# Patient Record
Sex: FEMALE | Race: WHITE | Employment: UNEMPLOYED | URBAN - METROPOLITAN AREA
[De-identification: names, ages, dates, MRNs, and addresses within clinical notes are randomized per-mention and may not be internally consistent; named-entity substitution may affect disease eponyms.]

---

## 2017-02-28 ENCOUNTER — HOSPITAL ENCOUNTER (EMERGENCY)
Age: 27
Discharge: HOME | End: 2017-02-28
Attending: FAMILY MEDICINE

## 2017-02-28 VITALS
TEMPERATURE: 97.7 F | SYSTOLIC BLOOD PRESSURE: 142 MMHG | BODY MASS INDEX: 26.9 KG/M2 | RESPIRATION RATE: 20 BRPM | OXYGEN SATURATION: 98 % | DIASTOLIC BLOOD PRESSURE: 83 MMHG | HEART RATE: 100 BPM | HEIGHT: 60 IN | WEIGHT: 137 LBS

## 2017-02-28 DIAGNOSIS — H66.002 ACUTE SUPPURATIVE OTITIS MEDIA OF LEFT EAR WITHOUT SPONTANEOUS RUPTURE OF TYMPANIC MEMBRANE, RECURRENCE NOT SPECIFIED: Primary | ICD-10-CM

## 2017-02-28 RX ORDER — LORAZEPAM 1 MG/1
1 TABLET ORAL
COMMUNITY
End: 2018-07-19

## 2017-02-28 RX ORDER — DEXTROAMPHETAMINE SACCHARATE, AMPHETAMINE ASPARTATE, DEXTROAMPHETAMINE SULFATE AND AMPHETAMINE SULFATE 5; 5; 5; 5 MG/1; MG/1; MG/1; MG/1
20 TABLET ORAL 3 TIMES DAILY
COMMUNITY

## 2017-02-28 RX ORDER — FLUTICASONE PROPIONATE 50 MCG
2 SPRAY, SUSPENSION (ML) NASAL DAILY
COMMUNITY

## 2017-02-28 RX ORDER — AMOXICILLIN 875 MG/1
875 TABLET, FILM COATED ORAL EVERY 12 HOURS
Qty: 20 TAB | Refills: 0 | Status: SHIPPED | OUTPATIENT
Start: 2017-02-28 | End: 2017-03-10

## 2017-03-01 NOTE — DISCHARGE INSTRUCTIONS
Ear Infection (Otitis Media): Care Instructions  Your Care Instructions    An ear infection may start with a cold and affect the middle ear (otitis media). It can hurt a lot. Most ear infections clear up on their own in a couple of days. Most often you will not need antibiotics. This is because many ear infections are caused by a virus. Antibiotics don't work against a virus. Regular doses of pain medicines are the best way to reduce your fever and help you feel better. Follow-up care is a key part of your treatment and safety. Be sure to make and go to all appointments, and call your doctor if you are having problems. It's also a good idea to know your test results and keep a list of the medicines you take. How can you care for yourself at home? · Take pain medicines exactly as directed. ¨ If the doctor gave you a prescription medicine for pain, take it as prescribed. ¨ If you are not taking a prescription pain medicine, take an over-the-counter medicine, such as acetaminophen (Tylenol), ibuprofen (Advil, Motrin), or naproxen (Aleve). Read and follow all instructions on the label. ¨ Do not take two or more pain medicines at the same time unless the doctor told you to. Many pain medicines have acetaminophen, which is Tylenol. Too much acetaminophen (Tylenol) can be harmful. · Plan to take a full dose of pain reliever before bedtime. Getting enough sleep will help you get better. · Try a warm, moist washcloth on the ear. It may help relieve pain. · If your doctor prescribed antibiotics, take them as directed. Do not stop taking them just because you feel better. You need to take the full course of antibiotics. When should you call for help? Call your doctor now or seek immediate medical care if:  · You have new or increasing ear pain. · You have new or increasing pus or blood draining from your ear. · You have a fever with a stiff neck or a severe headache.   Watch closely for changes in your health, and be sure to contact your doctor if:  · You have new or worse symptoms. · You are not getting better after taking an antibiotic for 2 days. Where can you learn more? Go to http://piotr-syed.info/. Enter Z342 in the search box to learn more about \"Ear Infection (Otitis Media): Care Instructions. \"  Current as of: July 29, 2016  Content Version: 11.1  © 1074-2922 [employer]. Care instructions adapted under license by [employer] (which disclaims liability or warranty for this information). If you have questions about a medical condition or this instruction, always ask your healthcare professional. [address] any warranty or liability for your use of this information.

## 2017-03-01 NOTE — UC PROVIDER NOTE
Patient is a 31 y.o. female presenting with ear pain. The history is provided by the patient. Ear Pain    This is a new problem. Episode onset: today. The problem occurs constantly. The problem has been gradually worsening. Patient complains that the left ear is affected. There has been no fever. The pain is moderate. Associated symptoms include rhinorrhea, sore throat and cough. Pertinent negatives include no ear discharge, no headaches, no hearing loss, no vomiting and no rash. The risk factors include recent URI. History reviewed. No pertinent past medical history. History reviewed. No pertinent surgical history. History reviewed. No pertinent family history. Social History     Social History    Marital status: SINGLE     Spouse name: N/A    Number of children: N/A    Years of education: N/A     Occupational History    Not on file. Social History Main Topics    Smoking status: Never Smoker    Smokeless tobacco: Not on file    Alcohol use Not on file    Drug use: Not on file    Sexual activity: Not on file     Other Topics Concern    Not on file     Social History Narrative    No narrative on file                ALLERGIES: Sulfa (sulfonamide antibiotics)    Review of Systems   Constitutional: Negative for chills and fever. HENT: Positive for congestion, ear pain, rhinorrhea and sore throat. Negative for ear discharge and hearing loss. Respiratory: Positive for cough. Cardiovascular: Negative for chest pain and palpitations. Gastrointestinal: Negative for nausea and vomiting. Musculoskeletal: Negative for arthralgias. Skin: Negative for rash. Neurological: Negative for headaches. Vitals:    02/28/17 1905   BP: 142/83   Pulse: 100   Resp: 20   Temp: 97.7 °F (36.5 °C)   SpO2: 98%   Weight: 62.1 kg (137 lb)   Height: 5' (1.524 m)       Physical Exam   Constitutional: She appears well-developed and well-nourished. No distress.    HENT:   Right Ear: External ear and ear canal normal. A middle ear effusion is present. Left Ear: External ear and ear canal normal. Tympanic membrane is erythematous and bulging. A middle ear effusion is present. Nose: Rhinorrhea present. Right sinus exhibits no maxillary sinus tenderness and no frontal sinus tenderness. Left sinus exhibits no maxillary sinus tenderness and no frontal sinus tenderness. Mouth/Throat: Oropharynx is clear and moist and mucous membranes are normal. No oropharyngeal exudate, posterior oropharyngeal edema, posterior oropharyngeal erythema or tonsillar abscesses. Cardiovascular: Normal rate, regular rhythm and normal heart sounds. Pulmonary/Chest: Effort normal and breath sounds normal. No respiratory distress. She has no wheezes. She has no rales. Lymphadenopathy:     She has cervical adenopathy. Neurological: She is alert. Skin: She is not diaphoretic. Psychiatric: She has a normal mood and affect. Her behavior is normal. Judgment and thought content normal.   Nursing note and vitals reviewed. MDM     Differential Diagnosis; Clinical Impression; Plan:     CLINICAL IMPRESSION:  Acute suppurative otitis media of left ear without spontaneous rupture of tympanic membrane, recurrence not specified  (primary encounter diagnosis)    Plan:  1. Amoxicillin  2. Ibuprofen prn  3. PCP if no improvement  Risk of Significant Complications, Morbidity, and/or Mortality:   Presenting problems: Moderate  Management options:   Moderate  Progress:   Patient progress:  Stable      Procedures

## 2017-03-10 ENCOUNTER — HOSPITAL ENCOUNTER (OUTPATIENT)
Dept: CT IMAGING | Age: 27
Discharge: HOME | End: 2017-03-10
Payer: COMMERCIAL

## 2017-03-10 DIAGNOSIS — N20.0 KIDNEY STONE: ICD-10-CM

## 2017-03-10 PROCEDURE — 74176 CT ABD & PELVIS W/O CONTRAST: CPT

## 2017-06-16 ENCOUNTER — HOSPITAL ENCOUNTER (OUTPATIENT)
Dept: LAB | Age: 27
Discharge: HOME | End: 2017-06-16

## 2017-06-16 ENCOUNTER — HOSPITAL ENCOUNTER (EMERGENCY)
Age: 27
Discharge: HOME | End: 2017-06-16
Attending: FAMILY MEDICINE

## 2017-06-16 VITALS
RESPIRATION RATE: 14 BRPM | HEART RATE: 104 BPM | HEIGHT: 60 IN | SYSTOLIC BLOOD PRESSURE: 109 MMHG | OXYGEN SATURATION: 95 % | BODY MASS INDEX: 25.72 KG/M2 | TEMPERATURE: 98 F | WEIGHT: 131 LBS | DIASTOLIC BLOOD PRESSURE: 63 MMHG

## 2017-06-16 DIAGNOSIS — R50.9 FEVER, UNSPECIFIED FEVER CAUSE: Primary | ICD-10-CM

## 2017-06-16 DIAGNOSIS — J02.9 VIRAL PHARYNGITIS: ICD-10-CM

## 2017-06-16 LAB
BILIRUB UR QL: ABNORMAL
GLUCOSE UR QL STRIP.AUTO: NEGATIVE MG/DL
HCG UR QL: NEGATIVE
KETONES UR-MCNC: NEGATIVE MG/DL
LEUKOCYTE ESTERASE UR QL STRIP: NEGATIVE
NITRITE UR QL: NEGATIVE
PH UR: 6 [PH] (ref 5–8)
PROT UR QL: 30 MG/DL
RBC # UR STRIP: NEGATIVE /UL
S PYO AG THROAT QL: NEGATIVE
SP GR UR: 1.03 (ref 1–1.03)
UROBILINOGEN UR QL: 0.2 EU/DL (ref 0.2–1)

## 2017-06-16 PROCEDURE — 87147 CULTURE TYPE IMMUNOLOGIC: CPT | Performed by: FAMILY MEDICINE

## 2017-06-16 PROCEDURE — 87070 CULTURE OTHR SPECIMN AEROBIC: CPT | Performed by: FAMILY MEDICINE

## 2017-06-16 RX ORDER — LIDOCAINE HYDROCHLORIDE 20 MG/ML
5 SOLUTION OROPHARYNGEAL AS NEEDED
Qty: 1 BOTTLE | Refills: 0 | Status: SHIPPED | OUTPATIENT
Start: 2017-06-16 | End: 2018-07-16

## 2017-06-16 NOTE — UC PROVIDER NOTE
Patient is a 31 y.o. female presenting with sore throat. The history is provided by the patient. No  was used. Sore Throat    This is a new problem. The current episode started yesterday. The problem has been gradually worsening. There has been a fever of 102 - 102.9 F. The fever has been present for 1 - 2 days. Associated symptoms include diarrhea. Pertinent negatives include no vomiting, no congestion, no swollen glands, no trouble swallowing and no cough. She has had no exposure to strep or mono. She has tried NSAIDs for the symptoms. The treatment provided moderate relief. No past medical history on file. No past surgical history on file. No family history on file. Social History     Social History    Marital status: SINGLE     Spouse name: N/A    Number of children: N/A    Years of education: N/A     Occupational History    Not on file. Social History Main Topics    Smoking status: Never Smoker    Smokeless tobacco: Not on file    Alcohol use Not on file    Drug use: Not on file    Sexual activity: Not on file     Other Topics Concern    Not on file     Social History Narrative                ALLERGIES: Sulfa (sulfonamide antibiotics)    Review of Systems   Constitutional: Positive for fatigue and fever. HENT: Positive for sore throat. Negative for congestion and trouble swallowing. Eyes: Negative. Respiratory: Negative for cough. Cardiovascular: Negative. Gastrointestinal: Positive for diarrhea. Negative for vomiting. Endocrine: Negative. Genitourinary: Negative. Musculoskeletal: Negative. Skin: Negative. Allergic/Immunologic: Negative. Neurological: Negative. Hematological: Negative. Psychiatric/Behavioral: Negative.         Vitals:    06/16/17 1351   BP: 109/63   Pulse: (!) 104   Resp: 14   Temp: 98 °F (36.7 °C)   SpO2: 95%   Weight: 59.4 kg (131 lb)   Height: 5' (1.524 m)       Physical Exam   Constitutional: She is oriented to person, place, and time. She appears well-developed and well-nourished. HENT:   Head: Normocephalic and atraumatic. Right Ear: External ear normal.   Left Ear: External ear normal.   Nose: Nose normal.   Mouth/Throat: No oropharyngeal exudate. + posterior pharyngeal erythema   No tonsillar enlargement  No cervical adenopthy   Eyes: Conjunctivae and EOM are normal. Pupils are equal, round, and reactive to light. Neck: Normal range of motion. Neck supple. Cardiovascular: Normal rate, regular rhythm and normal heart sounds.      Pulmonary/Chest: Effort normal and breath sounds normal. She has no wheezes. Abdominal: Soft. There is no tenderness. No CVAT   Musculoskeletal: Normal range of motion. Lymphadenopathy:     She has no cervical adenopathy. Neurological: She is alert and oriented to person, place, and time. Skin: Skin is warm and dry. Psychiatric: She has a normal mood and affect. Her behavior is normal. Judgment and thought content normal.   Nursing note and vitals reviewed. MDM     Differential Diagnosis; Clinical Impression; Plan:     CLINICAL IMPRESSION:  Fever, unspecified fever cause  (primary encounter diagnosis)  Viral pharyngitis    Plan:  1. Fever/ Viral Pharyngitis  2. Rest and drink plenty of fluids  3. Follow up with PCP as needed  Amount and/or Complexity of Data Reviewed:   Clinical lab tests:  Ordered and reviewed  Risk of Significant Complications, Morbidity, and/or Mortality:   Presenting problems: Moderate  Diagnostic procedures:   Moderate  Progress:   Patient progress:  Stable      Procedures

## 2017-06-16 NOTE — DISCHARGE INSTRUCTIONS
Sore Throat: Care Instructions  Your Care Instructions    Infection by bacteria or a virus causes most sore throats. Cigarette smoke, dry air, air pollution, allergies, and yelling can also cause a sore throat. Sore throats can be painful and annoying. Fortunately, most sore throats go away on their own. If you have a bacterial infection, your doctor may prescribe antibiotics. Follow-up care is a key part of your treatment and safety. Be sure to make and go to all appointments, and call your doctor if you are having problems. It's also a good idea to know your test results and keep a list of the medicines you take. How can you care for yourself at home? · If your doctor prescribed antibiotics, take them as directed. Do not stop taking them just because you feel better. You need to take the full course of antibiotics. · Gargle with warm salt water once an hour to help reduce swelling and relieve discomfort. Use 1 teaspoon of salt mixed in 1 cup of warm water. · Take an over-the-counter pain medicine, such as acetaminophen (Tylenol), ibuprofen (Advil, Motrin), or naproxen (Aleve). Read and follow all instructions on the label. · Be careful when taking over-the-counter cold or flu medicines and Tylenol at the same time. Many of these medicines have acetaminophen, which is Tylenol. Read the labels to make sure that you are not taking more than the recommended dose. Too much acetaminophen (Tylenol) can be harmful. · Drink plenty of fluids. Fluids may help soothe an irritated throat. Hot fluids, such as tea or soup, may help decrease throat pain. · Use over-the-counter throat lozenges to soothe pain. Regular cough drops or hard candy may also help. These should not be given to young children because of the risk of choking. · Do not smoke or allow others to smoke around you. If you need help quitting, talk to your doctor about stop-smoking programs and medicines.  These can increase your chances of quitting for good. · Use a vaporizer or humidifier to add moisture to your bedroom. Follow the directions for cleaning the machine. When should you call for help? Call your doctor now or seek immediate medical care if:  · You have new or worse trouble swallowing. · Your sore throat gets much worse on one side. Watch closely for changes in your health, and be sure to contact your doctor if you do not get better as expected. Where can you learn more? Go to http://piotr-syed.info/. Enter [id number] [id number] in the search box to learn more about \"Sore Throat: Care Instructions. \"  Current as of: July 29, 2016  Content Version: 11.2  © 1603-8068 [employer] Care instructions adapted under license by [employer] (which disclaims liability or warranty for this information). If you have questions about a medical condition or this instruction, always ask your healthcare professional. [place] [address] any warranty or liability for your use of this information. Learning About Fever  What is a fever? A fever is a high body temperature. It's one way your body fights being sick. A fever shows that the body is responding to infection or other illnesses, both minor and severe. A fever is a symptom, not an illness by itself. A fever can be a sign that you are ill, but most fevers are not caused by a serious problem. You may have a fever with a minor illness, such as a cold. But sometimes a very serious infection may cause little or no fever. It is important to look at other symptoms, other conditions you have, and how you feel in general. In children, notice how they act and see what symptoms they complain of. What is a normal body temperature? A normal body temperature is about 98. 6ºF. Some people have a normal temperature that is a little higher or a little lower than this. Your temperature may be a little lower in the morning than it is later in the day.  It may go up during hot weather or when you exercise, wear heavy clothes, or take a hot bath. Your temperature may also be different depending on how you take it. A temperature taken in the mouth (oral) or under the arm may be a little lower than your core temperature (rectal). What is a fever temperature? A core temperature of 100.4°F or above is considered a fever. What can cause a fever? A fever may be caused by:  · Infections. This is the most common cause of a fever. Examples of infections that can cause a fever include the flu, a kidney infection, or pneumonia. · Some medicines. · Severe trauma or injury, such as a heart attack, stroke, heatstroke, or burns. · Other medical conditions, such as arthritis and some cancers. How can you treat a fever at home? · Ask your doctor if you can take an over-the-counter pain medicine, such as acetaminophen (Tylenol), ibuprofen (Advil, Motrin), or naproxen (Aleve). Be safe with medicines. Read and follow all instructions on the label. · To prevent dehydration, drink plenty of fluids. Choose water and other caffeine-free clear liquids until you feel better. If you have kidney, heart, or liver disease and have to limit fluids, talk with your doctor before you increase the amount of fluids you drink. Follow-up care is a key part of your treatment and safety. Be sure to make and go to all appointments, and call your doctor if you are having problems. It's also a good idea to know your test results and keep a list of the medicines you take. Where can you learn more? Go to http://piotr-syed.info/. Enter H869 in the search box to learn more about \"Learning About Fever. \"  Current as of: May 27, 2016  Content Version: 11.2  © 8579-7650 [employer]. Care instructions adapted under license by [employer] (which disclaims liability or warranty for this information).  If you have questions about a medical condition or this instruction, always ask your healthcare professional. [place] [address] any warranty or liability for your use of this information.

## 2017-06-18 LAB
BACTERIA SPEC CULT: ABNORMAL
BACTERIA SPEC CULT: ABNORMAL
SERVICE CMNT-IMP: ABNORMAL

## 2017-06-19 RX ORDER — AMOXICILLIN AND CLAVULANATE POTASSIUM 875; 125 MG/1; MG/1
1 TABLET, FILM COATED ORAL 2 TIMES DAILY
Qty: 20 TAB | Refills: 0 | Status: SHIPPED | OUTPATIENT
Start: 2017-06-19 | End: 2017-06-29

## 2018-07-16 ENCOUNTER — HOSPITAL ENCOUNTER (INPATIENT)
Age: 28
LOS: 2 days | Discharge: HOME | DRG: 881 | End: 2018-07-19
Attending: EMERGENCY MEDICINE | Admitting: PSYCHIATRY & NEUROLOGY
Payer: COMMERCIAL

## 2018-07-16 DIAGNOSIS — F32.A DEPRESSION, UNSPECIFIED DEPRESSION TYPE: Primary | ICD-10-CM

## 2018-07-16 PROBLEM — F41.9 ANXIETY DISORDER: Status: ACTIVE | Noted: 2018-07-16

## 2018-07-16 LAB
ALBUMIN SERPL-MCNC: 3.9 G/DL (ref 3.5–5)
ALBUMIN/GLOB SERPL: 1 {RATIO} (ref 1.1–2.2)
ALP SERPL-CCNC: 108 U/L (ref 45–117)
ALT SERPL-CCNC: 38 U/L (ref 12–78)
AMPHET UR QL SCN: POSITIVE
ANION GAP SERPL CALC-SCNC: 7 MMOL/L (ref 5–15)
APAP SERPL-MCNC: <2 UG/ML (ref 10–30)
APPEARANCE UR: CLEAR
AST SERPL-CCNC: 21 U/L (ref 15–37)
BACTERIA URNS QL MICRO: NEGATIVE /HPF
BARBITURATES UR QL SCN: NEGATIVE
BASOPHILS # BLD: 0 K/UL (ref 0–0.1)
BASOPHILS NFR BLD: 0 % (ref 0–1)
BENZODIAZ UR QL: NEGATIVE
BILIRUB SERPL-MCNC: 0.4 MG/DL (ref 0.2–1)
BILIRUB UR QL: NEGATIVE
BUN SERPL-MCNC: 5 MG/DL (ref 6–20)
BUN/CREAT SERPL: 6 (ref 12–20)
CALCIUM SERPL-MCNC: 9.3 MG/DL (ref 8.5–10.1)
CANNABINOIDS UR QL SCN: NEGATIVE
CHLORIDE SERPL-SCNC: 107 MMOL/L (ref 97–108)
CO2 SERPL-SCNC: 27 MMOL/L (ref 21–32)
COCAINE UR QL SCN: NEGATIVE
COLOR UR: NORMAL
CREAT SERPL-MCNC: 0.79 MG/DL (ref 0.55–1.02)
DIFFERENTIAL METHOD BLD: ABNORMAL
DRUG SCRN COMMENT,DRGCM: ABNORMAL
EOSINOPHIL # BLD: 0.1 K/UL (ref 0–0.4)
EOSINOPHIL NFR BLD: 1 % (ref 0–7)
EPITH CASTS URNS QL MICRO: NORMAL /LPF
ERYTHROCYTE [DISTWIDTH] IN BLOOD BY AUTOMATED COUNT: 11.5 % (ref 11.5–14.5)
ETHANOL SERPL-MCNC: <10 MG/DL
GLOBULIN SER CALC-MCNC: 4 G/DL (ref 2–4)
GLUCOSE SERPL-MCNC: 97 MG/DL (ref 65–100)
GLUCOSE UR STRIP.AUTO-MCNC: NEGATIVE MG/DL
HCG UR QL: NEGATIVE
HCT VFR BLD AUTO: 47 % (ref 35–47)
HGB BLD-MCNC: 16 G/DL (ref 11.5–16)
HGB UR QL STRIP: NEGATIVE
IMM GRANULOCYTES # BLD: 0 K/UL (ref 0–0.04)
IMM GRANULOCYTES NFR BLD AUTO: 0 % (ref 0–0.5)
KETONES UR QL STRIP.AUTO: NEGATIVE MG/DL
LEUKOCYTE ESTERASE UR QL STRIP.AUTO: NEGATIVE
LYMPHOCYTES # BLD: 3 K/UL (ref 0.8–3.5)
LYMPHOCYTES NFR BLD: 26 % (ref 12–49)
MCH RBC QN AUTO: 30.2 PG (ref 26–34)
MCHC RBC AUTO-ENTMCNC: 34 G/DL (ref 30–36.5)
MCV RBC AUTO: 88.7 FL (ref 80–99)
METHADONE UR QL: NEGATIVE
MONOCYTES # BLD: 0.7 K/UL (ref 0–1)
MONOCYTES NFR BLD: 6 % (ref 5–13)
NEUTS SEG # BLD: 7.6 K/UL (ref 1.8–8)
NEUTS SEG NFR BLD: 66 % (ref 32–75)
NITRITE UR QL STRIP.AUTO: NEGATIVE
NRBC # BLD: 0 K/UL (ref 0–0.01)
NRBC BLD-RTO: 0 PER 100 WBC
OPIATES UR QL: NEGATIVE
PCP UR QL: NEGATIVE
PH UR STRIP: 7 [PH] (ref 5–8)
PLATELET # BLD AUTO: 264 K/UL (ref 150–400)
PMV BLD AUTO: 10.1 FL (ref 8.9–12.9)
POTASSIUM SERPL-SCNC: 4.1 MMOL/L (ref 3.5–5.1)
PROT SERPL-MCNC: 7.9 G/DL (ref 6.4–8.2)
PROT UR STRIP-MCNC: NEGATIVE MG/DL
RBC # BLD AUTO: 5.3 M/UL (ref 3.8–5.2)
RBC #/AREA URNS HPF: NORMAL /HPF (ref 0–5)
SALICYLATES SERPL-MCNC: <1.7 MG/DL (ref 2.8–20)
SODIUM SERPL-SCNC: 141 MMOL/L (ref 136–145)
SP GR UR REFRACTOMETRY: <1.005 (ref 1–1.03)
UR CULT HOLD, URHOLD: NORMAL
UROBILINOGEN UR QL STRIP.AUTO: 0.2 EU/DL (ref 0.2–1)
WBC # BLD AUTO: 11.5 K/UL (ref 3.6–11)
WBC URNS QL MICRO: NORMAL /HPF (ref 0–4)

## 2018-07-16 PROCEDURE — 80307 DRUG TEST PRSMV CHEM ANLYZR: CPT | Performed by: PHYSICIAN ASSISTANT

## 2018-07-16 PROCEDURE — 80053 COMPREHEN METABOLIC PANEL: CPT | Performed by: PHYSICIAN ASSISTANT

## 2018-07-16 PROCEDURE — 85025 COMPLETE CBC W/AUTO DIFF WBC: CPT | Performed by: PHYSICIAN ASSISTANT

## 2018-07-16 PROCEDURE — 84443 ASSAY THYROID STIM HORMONE: CPT | Performed by: PSYCHIATRY & NEUROLOGY

## 2018-07-16 PROCEDURE — 99284 EMERGENCY DEPT VISIT MOD MDM: CPT

## 2018-07-16 PROCEDURE — 90791 PSYCH DIAGNOSTIC EVALUATION: CPT

## 2018-07-16 PROCEDURE — 81001 URINALYSIS AUTO W/SCOPE: CPT | Performed by: PHYSICIAN ASSISTANT

## 2018-07-16 PROCEDURE — 81025 URINE PREGNANCY TEST: CPT

## 2018-07-16 PROCEDURE — 36415 COLL VENOUS BLD VENIPUNCTURE: CPT | Performed by: PHYSICIAN ASSISTANT

## 2018-07-16 RX ORDER — MELATONIN
1000 DAILY
Status: ON HOLD | COMMUNITY
End: 2018-07-17

## 2018-07-16 RX ORDER — LEVOTHYROXINE AND LIOTHYRONINE 9.5; 2.25 UG/1; UG/1
15 TABLET ORAL
COMMUNITY

## 2018-07-16 RX ORDER — LANOLIN ALCOHOL/MO/W.PET/CERES
1000 CREAM (GRAM) TOPICAL DAILY
COMMUNITY

## 2018-07-16 RX ORDER — GLUCOSAMINE/CHONDR SU A SOD 167-133 MG
500 CAPSULE ORAL
Status: ON HOLD | COMMUNITY
End: 2018-07-17

## 2018-07-16 RX ORDER — LEVOTHYROXINE AND LIOTHYRONINE 19; 4.5 UG/1; UG/1
30 TABLET ORAL
COMMUNITY

## 2018-07-16 RX ORDER — TOPIRAMATE 25 MG/1
25 TABLET ORAL
Status: ON HOLD | COMMUNITY
End: 2018-07-17

## 2018-07-16 NOTE — IP AVS SNAPSHOT
[address] 
[phone] Patient: Massiel Briscoe MRN: [id number] :1990 About your hospitalization You were admitted on:  2018 You last received care in the:  [address] You were discharged on:  2018 Why you were hospitalized Your primary diagnosis was: Anxiety Disorder Your diagnoses also included:  Depressive Disorder Follow-up Information Follow up With Details Comments Contact Info Cipriano Early On 2018 You have a 3:00pm appointment for individual therapy. [address] [facility] 700 1400 [facility], [address] [place] [address] 
[phone] Payam Aquino MD On 2018 You have a 1:30pm appointment with your primary care provider. 612 [facility] [address] [facility] 7 [address] 
[phone] 1305 [facility] Call Please call to schedule an appointment with a psychiatric nurse practitioner for medication management. 6800  39[facility], [facility] [place], 1201 [facility] 
[phone] Discharge Orders None A check candice indicates which time of day the medication should be taken. My Medications START taking these medications Instructions Each Dose to Equal  
 Morning Noon Evening Bedtime  
 mirtazapine 15 mg tablet Commonly known as:  Omaira Bailey Your next dose is:  bedtime Take 1 Tab by mouth nightly. Indications: major depressive disorder 15 mg CONTINUE taking these medications Instructions Each Dose to Equal  
 Morning Noon Evening Bedtime ADDERALL 20 mg tablet Generic drug:  dextroamphetamine-amphetamine Your next dose is: Today at noon and 4pm   
   
 Take 20 mg by mouth three (3) times daily. 20 mg  
    
  
   
  
   
  
   
  
 * ARMOUR THYROID 15 mg tablet Generic drug:  thyroid (Pork) Your next dose is:  Tomorrow before breakfast  
   
 Take 15 mg by mouth Daily (before breakfast). 15 mg  
    
  
   
   
   
  
 * SUSANNE THYROID 30 mg tablet Generic drug:  thyroid (Pork) Your next dose is:  Before breakfast  
   
 Take 30 mg by mouth Daily (before breakfast). 30 mg  
    
  
   
   
   
  
 cholecalciferol (VITAMIN D3) 5,000 unit Tab tablet Commonly known as:  VITAMIN D3 Your next dose is:  9am  
   
 Take 5,000 Units by mouth daily. 5000 Units FLONASE 50 mcg/actuation nasal spray Generic drug:  fluticasone Your next dose is:  9am and 9pm  
   
 2 Sprays by Both Nostrils route daily. 2 [place] LORYNA (28) 3-0.02 mg Tab Generic drug:  drospirenone-ethinyl estradiol Your next dose is:  9am 
  
   
 Take 1 Tab by mouth daily. Indications: Pregnancy Contraception 1 Tab  
    
  
   
   
   
  
 niacin  mg tablet Commonly known as:  Patricia Lauryn Your next dose is:  Bedtime Take 500 mg by mouth nightly. 500 mg  
    
   
   
   
  
  
 TROKENDI XR 25 mg capsule Generic drug:  topiramate ER Your next dose is:  9am  
   
 Take 25 mg by mouth daily. Indications: MIGRAINE PREVENTION  
 25 mg  
    
  
   
   
   
  
 VITAMIN B-12 1,000 mcg tablet Generic drug:  cyanocobalamin Your next dose is:  9am  
   
 Take 1,000 mcg by mouth daily. 1000 mcg * Notice: This list has 2 medication(s) that are the same as other medications prescribed for you. Read the directions carefully, and ask your doctor or other care provider to review them with you. STOP taking these medications ATIVAN 1 mg tablet Generic drug:  LORazepam  
   
  
  
  
Where to Get Your Medications Information on where to get these meds will be given to you by the nurse or doctor. ! Ask your nurse or doctor about these medications  
  mirtazapine 15 mg tablet Discharge Instructions DISCHARGE SUMMARY:  Substance Abuse NAME:Martha Davidson : 1990 MRN: [id number] The patient Laverne Baer exhibits the ability to function in a less restrictive environment. There has been no evidence of withdrawal for the past 24 hours. No suicidal/homicidal threat or behavior has been observed for the past 24 hours. If weapons involved, how are they secured? No weapons involved. Is patient aware of and in agreement with discharge plan? Yes Arrangements for medication:  Prescriptions given to patient. Referral for substance treatment? Yes, Dr. Franklin Vasquez Referral for smoking cessation needed? Yes, refused. Copy of discharge instructions to provider?:  Dr. Michael Taylor Arrangements for transportation home:  Patient's personal vehicle is in the hospital parking deck. Mental health crisis number:  933 or your local mental health crisis line number at [phone]. DISCHARGE SUMMARY from Nurse PATIENT INSTRUCTIONS: 
 
What to do at Home: 
Recommended activity: Activity as tolerated, If you experience any of the following symptoms thoughts of harming self, feeling overwhelmed with anxiety, hopelessness or sadness, please follow up with your assigned providers and local crisis number. *  Please give a list of your current medications to your Primary Care Provider. *  Please update this list whenever your medications are discontinued, doses are 
    changed, or new medications (including over-the-counter products) are added. *  Please carry medication information at all times in case of emergency situations. These are general instructions for a healthy lifestyle: No smoking/ No tobacco products/ Avoid exposure to second hand smoke Surgeon General's Warning:  Quitting smoking now greatly reduces serious risk to your health. Obesity, smoking, and sedentary lifestyle greatly increases your risk for illness A healthy diet, regular physical exercise & weight monitoring are important for maintaining a healthy lifestyle You may be retaining fluid if you have a history of heart failure or if you experience any of the following symptoms:  Weight gain of 3 pounds or more overnight or 5 pounds in a week, increased swelling in our hands or feet or shortness of breath while lying flat in bed. Please call your doctor as soon as you notice any of these symptoms; do not wait until your next office visit. Recognize signs and symptoms of STROKE: 
 
F-face looks uneven A-arms unable to move or move unevenly S-speech slurred or non-existent T-time-call 911 as soon as signs and symptoms begin-DO NOT go Back to bed or wait to see if you get better-TIME IS BRAIN. Warning Signs of HEART ATTACK Call 911 if you have these symptoms: 
? Chest discomfort. Most heart attacks involve discomfort in the center of the chest that lasts more than a few minutes, or that goes away and comes back. It can feel like uncomfortable pressure, squeezing, fullness, or pain. ? Discomfort in other areas of the upper body. Symptoms can include pain or discomfort in one or both arms, the back, neck, jaw, or stomach. ? Shortness of breath with or without chest discomfort. ? Other signs may include breaking out in a cold sweat, nausea, or lightheadedness. Don't wait more than five minutes to call [address]! Fast action can save your life. Calling 911 is almost always the fastest way to get lifesaving treatment. Emergency Medical Services staff can begin treatment when they arrive  up to an hour sooner than if someone gets to the hospital by car. The discharge information has been reviewed with the patient. The patient verbalized understanding. Discharge medications reviewed with the patient and appropriate educational materials and side effects teaching were provided.  
___________________________________________________________________________ ________________________________________________________ [employer] Announcement We are excited to announce that we are making your provider's discharge notes available to you in [employer]. You will see these notes when they are completed and signed by the physician that discharged you from your recent hospital stay. If you have any questions or concerns about any information you see in [employer], please call the Health Information Department where you were seen or reach out to your Primary Care Provider for more information about your plan of care. Introducing [facility]! Dear Karlee Bassett: Thank you for requesting a [employer] account. Our records indicate that you already have an active [employer] account. You can access your account anytime at [email or web address] Did you know that you can access your hospital and ER discharge instructions at any time in [employer]? You can also review all of your test results from your hospital stay or ER visit. Additional Information If you have questions, please visit the Frequently Asked Questions section of the [employer] website at [email or web address] Remember, [employer] is NOT to be used for urgent needs. For medical emergencies, dial 911. Now available from your iPhone and Android! Introducing Apolinar Davidson As a [facility] patient, I wanted to make you aware of our electronic visit tool called Apolinar Davidson. [facility] 24/7 allows you to connect within minutes with a medical provider 24 hours a day, seven days a week via a mobile device or tablet or logging into a secure website from your computer. You can access Apolinar Shadieleno from anywhere in the [place].  
 
A virtual visit might be right for you when you have a simple condition and feel like you just dont want to get out of bed, or cant get away from work for an appointment, when your regular [facility] provider is not available (evenings, weekends or holidays), or when youre out of town and need minor care. Electronic visits cost only $49 and if the [facility] 24/7 provider determines a prescription is needed to treat your condition, one can be electronically transmitted to a nearby pharmacy*. Please take a moment to enroll today if you have not already done so. The enrollment process is free and takes just a few minutes. To enroll, please download the [employer] leigh ann to your tablet or phone, or visit [email or web address] to enroll on your computer. And, as an [address] [place] [address] patient with a [employer] account, the results of your visits will be scanned into your electronic medical record and your primary care provider will be able to view the scanned results. We urge you to continue to see your regular [facility] provider for your ongoing medical care. And while your primary care provider may not be the one available when you seek a [employer] virtual visit, the peace of mind you get from getting a real diagnosis real time can be priceless. For more information on [employer], view our Frequently Asked Questions (FAQs) at [email or web address]. Sincerely, 
 
Tray Schaeffer MD 
Chief Medical Officer [facility] Alondra Glass *:  certain medications cannot be prescribed via [employer] Providers Seen During Your Hospitalization Provider Specialty Primary office phone Yani Rodriguez MD Emergency Medicine [phone] Debbie Meyers MD Psychiatry [phone] Omaira Thao MD Psychiatry [phone] Your Primary Care Physician (PCP) Primary Care Physician Office Phone Office Fax Neha Wilson [phone] You are allergic to the following Allergen Reactions Amoxicillin Rash  
    
 Sulfa (Sulfonamide Antibiotics) Rash Recent Documentation Height Weight BMI OB Status Smoking Status 1.524 m 63 kg 27.11 kg/m2 Having regular periods Never Smoker Emergency Contacts Name Discharge Info Relation Home Work Mobile [address] [facility] CAREGIVER [3] Mother [14] [phone] Patient Belongings The following personal items are in your possession at time of discharge: 
  Dental Appliances: None  Visual Aid: Glasses      Home Medications: None   Jewelry: None  Clothing: Pajamas, Pants, Shirt, Socks, Undergarments (5undues,3bra,hoodie,4socks,5shrts)    Other Valuables: Other (comment) (duffel bag)  Personal Items Sent to Safe: None Please provide this summary of care documentation to your next provider. Signatures-by signing, you are acknowledging that this After Visit Summary has been reviewed with you and you have received a copy. Patient Signature:  ____________________________________________________________ Date:  ____________________________________________________________  
  
[facility] Provider Signature:  ____________________________________________________________ Date:  ____________________________________________________________

## 2018-07-16 NOTE — ED NOTES
6:13 PM  I have evaluated the patient as the Provider in Triage. I have reviewed Her vital signs and the triage nurse assessment. I have talked with the patient and any available family and advised that I am the provider in triage and have ordered the appropriate study to initiate their work up based on the clinical presentation during my assessment. I have advised that the patient will be accommodated in the Main ED as soon as possible. I have also requested to contact the triage nurse or myself immediately if the patient experiences any changes in their condition during this brief waiting period. Patient reports worsening depression, saw her PCP last week and was started on Cymbalta, saw her counselor today who advised her to come to the ED for admission. She endorses suicidal ideation and visual hallucinations.   CHANTELLE Diallo

## 2018-07-16 NOTE — IP AVS SNAPSHOT
[facility] [address] 
[phone] Patient: Florentino Leos MRN: [id number] :1990 A check candice indicates which time of day the medication should be taken. My Medications START taking these medications Instructions Each Dose to Equal  
 Morning Noon Evening Bedtime  
 mirtazapine 15 mg tablet Commonly known as:  Jade Morel Your next dose is:  bedtime Take 1 Tab by mouth nightly. Indications: major depressive disorder 15 mg CONTINUE taking these medications Instructions Each Dose to Equal  
 Morning Noon Evening Bedtime ADDERALL 20 mg tablet Generic drug:  dextroamphetamine-amphetamine Your next dose is: Today at noon and 4pm   
   
 Take 20 mg by mouth three (3) times daily. 20 mg  
    
  
   
  
   
  
   
  
 * ARMOUR THYROID 15 mg tablet Generic drug:  thyroid (Pork) Your next dose is:  Tomorrow before breakfast  
   
 Take 15 mg by mouth Daily (before breakfast). 15 mg  
    
  
   
   
   
  
 * ARMOUR THYROID 30 mg tablet Generic drug:  thyroid (Pork) Your next dose is:  Before breakfast  
   
 Take 30 mg by mouth Daily (before breakfast). 30 mg  
    
  
   
   
   
  
 cholecalciferol (VITAMIN D3) 5,000 unit Tab tablet Commonly known as:  VITAMIN D3 Your next dose is:  9am  
   
 Take 5,000 Units by mouth daily. 5000 Units FLONASE 50 mcg/actuation nasal spray Generic drug:  fluticasone Your next dose is:  9am and 9pm  
   
 2 Sprays by Both Nostrils route daily. 2 [place] LORYNA (28) 3-0.02 mg Tab Generic drug:  drospirenone-ethinyl estradiol Your next dose is:  9am 
  
   
 Take 1 Tab by mouth daily. Indications: Pregnancy Contraception 1 Tab  
    
  
   
   
   
  
 niacin  mg tablet Commonly known as:  Cayla Chen Your next dose is:  Bedtime Take 500 mg by mouth nightly. 500 mg  
    
   
   
   
  
  
 TROKENDI XR 25 mg capsule Generic drug:  topiramate ER Your next dose is:  9am  
   
 Take 25 mg by mouth daily. Indications: MIGRAINE PREVENTION  
 25 mg  
    
  
   
   
   
  
 VITAMIN B-12 1,000 mcg tablet Generic drug:  cyanocobalamin Your next dose is:  9am  
   
 Take 1,000 mcg by mouth daily. 1000 mcg * Notice: This list has 2 medication(s) that are the same as other medications prescribed for you. Read the directions carefully, and ask your doctor or other care provider to review them with you. STOP taking these medications ATIVAN 1 mg tablet Generic drug:  LORazepam  
   
  
  
  
Where to Get Your Medications Information on where to get these meds will be given to you by the nurse or doctor. ! Ask your nurse or doctor about these medications  
  mirtazapine 15 mg tablet

## 2018-07-16 NOTE — ED TRIAGE NOTES
Pt reports increasing anxiety and depression and \"passiving suicidal ideation\". Denies plan. Denies HI. Referred by PCP to ER today for admission. Reprots visual hallucinations.

## 2018-07-17 LAB — TSH SERPL DL<=0.05 MIU/L-ACNC: 0.11 UIU/ML (ref 0.36–3.74)

## 2018-07-17 PROCEDURE — 65220000003 HC RM SEMIPRIVATE PSYCH

## 2018-07-17 PROCEDURE — 74011250637 HC RX REV CODE- 250/637: Performed by: PSYCHIATRY & NEUROLOGY

## 2018-07-17 RX ORDER — BENZTROPINE MESYLATE 1 MG/ML
2 INJECTION INTRAMUSCULAR; INTRAVENOUS
Status: DISCONTINUED | OUTPATIENT
Start: 2018-07-17 | End: 2018-07-19 | Stop reason: HOSPADM

## 2018-07-17 RX ORDER — BUPROPION HYDROCHLORIDE 100 MG/1
100 TABLET, EXTENDED RELEASE ORAL DAILY
Status: DISCONTINUED | OUTPATIENT
Start: 2018-07-17 | End: 2018-07-18

## 2018-07-17 RX ORDER — LEVOTHYROXINE AND LIOTHYRONINE 38; 9 UG/1; UG/1
45 TABLET ORAL
Status: DISCONTINUED | OUTPATIENT
Start: 2018-07-17 | End: 2018-07-19 | Stop reason: HOSPADM

## 2018-07-17 RX ORDER — IBUPROFEN 400 MG/1
400 TABLET ORAL
Status: DISCONTINUED | OUTPATIENT
Start: 2018-07-17 | End: 2018-07-19 | Stop reason: HOSPADM

## 2018-07-17 RX ORDER — BENZTROPINE MESYLATE 1 MG/1
2 TABLET ORAL
Status: DISCONTINUED | OUTPATIENT
Start: 2018-07-17 | End: 2018-07-19 | Stop reason: HOSPADM

## 2018-07-17 RX ORDER — NIACIN 500 MG/1
500 TABLET, EXTENDED RELEASE ORAL
COMMUNITY

## 2018-07-17 RX ORDER — TOPIRAMATE 25 MG/1
25 TABLET ORAL
Status: DISCONTINUED | OUTPATIENT
Start: 2018-07-17 | End: 2018-07-19 | Stop reason: HOSPADM

## 2018-07-17 RX ORDER — DROSPIRENONE AND ETHINYL ESTRADIOL 0.02-3(28)
1 KIT ORAL DAILY
COMMUNITY

## 2018-07-17 RX ORDER — OLANZAPINE 5 MG/1
5 TABLET ORAL
Status: DISCONTINUED | OUTPATIENT
Start: 2018-07-17 | End: 2018-07-19 | Stop reason: HOSPADM

## 2018-07-17 RX ORDER — AMITRIPTYLINE HYDROCHLORIDE 25 MG/1
25 TABLET, FILM COATED ORAL
Status: DISCONTINUED | OUTPATIENT
Start: 2018-07-17 | End: 2018-07-18

## 2018-07-17 RX ORDER — LEVOTHYROXINE AND LIOTHYRONINE 38; 9 UG/1; UG/1
15 TABLET ORAL
Status: DISCONTINUED | OUTPATIENT
Start: 2018-07-17 | End: 2018-07-17

## 2018-07-17 RX ORDER — LORAZEPAM 1 MG/1
1 TABLET ORAL
Status: CANCELLED | OUTPATIENT
Start: 2018-07-17

## 2018-07-17 RX ORDER — CHOLECALCIFEROL TAB 125 MCG (5000 UNIT) 125 MCG
5000 TAB ORAL DAILY
COMMUNITY

## 2018-07-17 RX ORDER — BUSPIRONE HYDROCHLORIDE 5 MG/1
5 TABLET ORAL 3 TIMES DAILY
Status: DISCONTINUED | OUTPATIENT
Start: 2018-07-17 | End: 2018-07-18

## 2018-07-17 RX ORDER — BENZTROPINE MESYLATE 1 MG/ML
2 INJECTION INTRAMUSCULAR; INTRAVENOUS
Status: CANCELLED | OUTPATIENT
Start: 2018-07-17

## 2018-07-17 RX ORDER — LORAZEPAM 0.5 MG/1
0.5 TABLET ORAL
Status: DISCONTINUED | OUTPATIENT
Start: 2018-07-17 | End: 2018-07-19 | Stop reason: HOSPADM

## 2018-07-17 RX ORDER — HYDROXYZINE HYDROCHLORIDE 10 MG/1
10 TABLET, FILM COATED ORAL 3 TIMES DAILY
Status: DISCONTINUED | OUTPATIENT
Start: 2018-07-17 | End: 2018-07-18

## 2018-07-17 RX ORDER — ADHESIVE BANDAGE
30 BANDAGE TOPICAL DAILY PRN
Status: CANCELLED | OUTPATIENT
Start: 2018-07-17

## 2018-07-17 RX ORDER — IBUPROFEN 200 MG
1 TABLET ORAL
Status: DISCONTINUED | OUTPATIENT
Start: 2018-07-17 | End: 2018-07-19 | Stop reason: HOSPADM

## 2018-07-17 RX ORDER — OLANZAPINE 5 MG/1
5 TABLET ORAL
Status: CANCELLED | OUTPATIENT
Start: 2018-07-17

## 2018-07-17 RX ORDER — LORAZEPAM 2 MG/ML
2 INJECTION INTRAMUSCULAR
Status: DISCONTINUED | OUTPATIENT
Start: 2018-07-17 | End: 2018-07-19 | Stop reason: HOSPADM

## 2018-07-17 RX ORDER — LORAZEPAM 1 MG/1
1 TABLET ORAL
Status: DISCONTINUED | OUTPATIENT
Start: 2018-07-17 | End: 2018-07-17

## 2018-07-17 RX ORDER — BENZTROPINE MESYLATE 2 MG/1
2 TABLET ORAL
Status: CANCELLED | OUTPATIENT
Start: 2018-07-17

## 2018-07-17 RX ORDER — IBUPROFEN 400 MG/1
400 TABLET ORAL
Status: CANCELLED | OUTPATIENT
Start: 2018-07-17

## 2018-07-17 RX ORDER — IBUPROFEN 200 MG
1 TABLET ORAL
Status: CANCELLED | OUTPATIENT
Start: 2018-07-17

## 2018-07-17 RX ORDER — LORAZEPAM 2 MG/ML
2 INJECTION INTRAMUSCULAR
Status: CANCELLED | OUTPATIENT
Start: 2018-07-17

## 2018-07-17 RX ORDER — ZOLPIDEM TARTRATE 5 MG/1
10 TABLET ORAL
Status: CANCELLED | OUTPATIENT
Start: 2018-07-17

## 2018-07-17 RX ORDER — ACETAMINOPHEN 325 MG/1
650 TABLET ORAL
Status: CANCELLED | OUTPATIENT
Start: 2018-07-17

## 2018-07-17 RX ORDER — LEVOTHYROXINE AND LIOTHYRONINE 38; 9 UG/1; UG/1
30 TABLET ORAL
Status: DISCONTINUED | OUTPATIENT
Start: 2018-07-17 | End: 2018-07-17 | Stop reason: SDUPTHER

## 2018-07-17 RX ORDER — ADHESIVE BANDAGE
30 BANDAGE TOPICAL DAILY PRN
Status: DISCONTINUED | OUTPATIENT
Start: 2018-07-17 | End: 2018-07-19 | Stop reason: HOSPADM

## 2018-07-17 RX ORDER — ZOLPIDEM TARTRATE 5 MG/1
5 TABLET ORAL
Status: DISCONTINUED | OUTPATIENT
Start: 2018-07-17 | End: 2018-07-19 | Stop reason: HOSPADM

## 2018-07-17 RX ORDER — ACETAMINOPHEN 325 MG/1
650 TABLET ORAL
Status: DISCONTINUED | OUTPATIENT
Start: 2018-07-17 | End: 2018-07-19 | Stop reason: HOSPADM

## 2018-07-17 RX ADMIN — THYROID, PORCINE 30 MG: 60 TABLET ORAL at 12:00

## 2018-07-17 RX ADMIN — HYDROXYZINE HYDROCHLORIDE 10 MG: 10 TABLET, FILM COATED ORAL at 16:37

## 2018-07-17 RX ADMIN — ZOLPIDEM TARTRATE 5 MG: 5 TABLET ORAL at 23:33

## 2018-07-17 RX ADMIN — TOPIRAMATE 25 MG: 25 TABLET, FILM COATED ORAL at 21:29

## 2018-07-17 RX ADMIN — THYROID, PORCINE 45 MG: 60 TABLET ORAL at 13:38

## 2018-07-17 RX ADMIN — BUPROPION HYDROCHLORIDE 100 MG: 100 TABLET, FILM COATED, EXTENDED RELEASE ORAL at 13:35

## 2018-07-17 RX ADMIN — BUSPIRONE HYDROCHLORIDE 5 MG: 5 TABLET ORAL at 16:37

## 2018-07-17 RX ADMIN — LORAZEPAM 1 MG: 1 TABLET ORAL at 08:29

## 2018-07-17 RX ADMIN — HYDROXYZINE HYDROCHLORIDE 10 MG: 10 TABLET, FILM COATED ORAL at 22:15

## 2018-07-17 RX ADMIN — BUSPIRONE HYDROCHLORIDE 5 MG: 5 TABLET ORAL at 22:15

## 2018-07-17 RX ADMIN — AMITRIPTYLINE HYDROCHLORIDE 25 MG: 25 TABLET, FILM COATED ORAL at 21:28

## 2018-07-17 NOTE — BH NOTES
Raciel Stack bhTRANSFER - IN REPORT:    Verbal report received from  Alana Beltran  on [employer]  being received from [facility] ER (unit) for routine progression of care      Report consisted of patients Situation, Background, Assessment and   Recommendations(SBAR). Information from the following report(s) SBAR, Kardex and ED Summary was reviewed with the receiving nurse. Opportunity for questions and clarification was provided. Assessment completed upon patients arrival to unit and care assumed. ADMISSION  NOTE  Voluntary admit for 28year old female from [facility] ER for complaint of Depression and Anxiety. Reported passive suicidal ideations but as no plan and made verbal contract for safety. Allergies include Amoxicillin and sulfur. Medical issues include hypothyroid, PTSD   ADHD and Endometriosis    Cooperative with staff interview for admission and and items sent to security and in medication room

## 2018-07-17 NOTE — PROGRESS NOTES
100 [facility] 60  Master Treatment Plan for BEBETO TranCharlotte    Date Treatment Plan Initiated: 07/17/18    Treatment Plan Modalities:  Type of Modality Amount  (x minutes) Frequency (x/week) Duration (x days) Name of Responsible Staff   Community & wrap-up meetings to encourage peer interactions 15 [address] [place] psychotherapy to assist in building coping skills and internal controls 60 7 1 Giorgi Hines   Therapeutic activity groups to build coping skills 60 7 1 Giorgi Hines   Psychoeducation in group setting to address:   Medication education   92 [facility] skills         Relaxation techniques         Symptom management         Discharge planning   60 2 255 [facility]    60 2 [facility]   60 1 1 volunteer   Recovery/AA/NA      volunteer   Physician medication management   13 7 1 Dr. Saniya Monreal meeting/discharge planning   15 2 1400 [facility]                                          Problem: Depressed Mood (Adult/Pediatric) These goals will be met by 07/21/18  Goal: *STG: Participates in treatment plan  Outcome: Progressing Towards Goal  Pt is cooperative, following unit rules, attending groups, expressing feelings, and no longer suicidal  Goal: *STG: Verbalizes anger, guilt, and other feelings in a constructive manor  Outcome: Progressing Towards Goal  Pt does not display signs of anger and denies suicidal thoughts  Goal: *STG: Attends activities and groups  Outcome: Progressing Towards Goal  Pt is attending group activities  Goal: *STG: Demonstrates reduction in symptoms and increase in insight into coping skills/future focused  Outcome: Progressing Towards Goal  Pt is no longer suicidal, admits to taking too much medication, and expresses lack of coping skills  Goal: *STG: Complies with medication therapy  Outcome: Progressing Towards Goal  Pt agrees to change in medication therapy  Goal: Interventions  Outcome: Progressing Towards Goal  Medication and coping skills education
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
## 2018-07-17 NOTE — PROGRESS NOTES
Christie Velazquez actively participated in Spirituality Group about being loved on [address] [facility]  : Rev. Lance Wasserman.  Fatmata Saunders; [facility], to contact [phone] Kavon Martinez call: [phone]

## 2018-07-17 NOTE — BH NOTES
PSYCHIATRIC PROGRESS NOTE         Patient Name  Laverne Baer   Date of Birth 1990   [facility] [id number]   Medical Record Number  [id number]      Age  28 y.o. PCP Michael Taylor MD   Admit date:  7/16/2018    Room Number  729/02  @ [facility]   Date of Service  7/17/2018          PSYCHOTHERAPY SESSION NOTE:  Length of psychotherapy session: 45 minutes    Main condition/diagnosis/issues treated during session today, 7/17/2018 : sobriety, depression , anxiety coping skills     I employed Cognitive Behavioral therapy techniques, Reality-Oriented psychotherapy, as well as supportive psychotherapy in regards to various ongoing psychosocial stressors, including the following: pre-admission and current problems; medical issues; and stress of hospitalization. Interpersonal relationship issues and psychodynamic conflicts explored. Attempts made to alleviate maladaptive patterns. We, also, worked on issues of denial & effects of substance dependency/use     Overall, patient is not progressing    Treatment Plan Update (reviewed an updated 7/17/2018) : I will modify psychotherapy tx plan by implementing more stress management strategies, building upon cognitive behavioral techniques, increasing coping skills, as well as shoring up psychological defenses). An extended energy and skill set was needed to engage pt in psychotherapy due to some of the following: resistiveness, complexity, negativity, confrontational nature, hostile behaviors, and/or severe abnormalities in thought processes/psychosis resulting in the loss of expressive/receptive language communication skills. E & M PROGRESS NOTE:         HISTORY       CC:  \"anxiety and depression \"  HISTORY OF PRESENT ILLNESS/INTERVAL HISTORY:  (reviewed/updated 7/17/2018). per initial evaluation:     Laverne Baer presents/reports/evidences the following emotional symptoms today, 7/17/2018:anxiety.  The above symptoms have been present for years. These symptoms are of severe severity. The symptoms are intermittent/ fleeting in nature. Additional symptomatology and features include anxiety. SIDE EFFECTS: (reviewed/updated 2018)  None reported or admitted to. No noted toxicity with use of Depakote/Tegretol/lithium/Clozaril/TCAs   ALLERGIES:(reviewed/updated 2018)  Allergies   Allergen Reactions    Amoxicillin Rash    Sulfa (Sulfonamide Antibiotics) Rash      MEDICATIONS PRIOR TO ADMISSION:(reviewed/updated 2018)  Prescriptions Prior to Admission   Medication Sig    cholecalciferol, VITAMIN D3, (VITAMIN D3) 5,000 unit tab tablet Take 5,000 Units by mouth daily.  niacin ER (NIASPAN) 500 mg tablet Take 500 mg by mouth nightly.  topiramate ER (TROKENDI XR) 25 mg capsule Take 25 mg by mouth daily. Indications: MIGRAINE PREVENTION    thyroid, Pork, (ARMOUR THYROID) 15 mg tablet Take 15 mg by mouth Daily (before breakfast).  thyroid, Pork, (ARMOUR THYROID) 30 mg tablet Take 30 mg by mouth Daily (before breakfast).  cyanocobalamin (VITAMIN B-12) 1,000 mcg tablet Take 1,000 mcg by mouth daily.  dextroamphetamine-amphetamine (ADDERALL) 20 mg tablet Take 20 mg by mouth three (3) times daily.  LORazepam (ATIVAN) 1 mg tablet Take 1 mg by mouth two (2) times daily as needed for Anxiety.  fluticasone (FLONASE) 50 mcg/actuation nasal spray 2 Sprays by Both Nostrils route daily. PAST MEDICAL HISTORY: Past medical history from the initial psychiatric evaluation has been reviewed (reviewed/updated 2018) with no additional updates (I asked patient and no additional past medical history provided).  Past Medical History:   Diagnosis Date    ADHD     Delayed gastric emptying     Endometritis     Hypothyroid     PCOS (polycystic ovarian syndrome)     Psychiatric disorder     anxiety depression     Past Surgical History:   Procedure Laterality Date    HX  SECTION      HX CHOLECYSTECTOMY        SOCIAL HISTORY: Social history from the initial psychiatric evaluation has been reviewed (reviewed/updated 7/17/2018) with no additional updates (I asked patient and no additional social history provided). Social History     Social History    Marital status: SINGLE     Spouse name: N/A    Number of children: N/A    Years of education: N/A     Occupational History    Not on file. Social History Main Topics    Smoking status: Never Smoker    Smokeless tobacco: Never Used    Alcohol use No    Drug use: No    Sexual activity: Not on file     Other Topics Concern    Not on file     Social History Narrative    28YEAR OLD  female admitted voluntarily for depression and anxiety, PT has been on adderal and has had hallucinatory experiences. She was placed on Aderal in 2013 when she was \"working full time and also in graduate school for neurobiology. Pt has been unemployed since May 2018 and has  from her boyfriend as well. She lives with her 3year old daughter and her mother. FAMILY HISTORY: Family history from the initial psychiatric evaluation has been reviewed (reviewed/updated 7/17/2018) with no additional updates (I asked patient and no additional family history provided). History reviewed. No pertinent family history.     REVIEW OF SYSTEMS: (reviewed/updated 7/17/2018)  Appetite:no change from normal   Sleep: does not feel rested   All other Review of Systems: Psychological ROS: positive for - behavioral disorder  Respiratory ROS: no cough, shortness of breath, or wheezing  Cardiovascular ROS: no chest pain or dyspnea on exertion         [facility] (MSE):    MSE FINDINGS ARE WITHIN NORMAL LIMITS (WNL) UNLESS OTHERWISE STATED BELOW. ( ALL OF THE BELOW CATEGORIES OF THE MSE HAVE BEEN REVIEWED (reviewed 7/17/2018) AND UPDATED AS DEEMED APPROPRIATE )  General Presentation age appropriate, cooperative   Orientation oriented to time, place and person Vital Signs  See below (reviewed 7/17/2018); Vital Signs (BP, Pulse, & Temp) are within normal limits if not listed below.    Gait and Station Stable/steady, no ataxia   Musculoskeletal System No extrapyramidal symptoms (EPS); no abnormal muscular movements or Tardive Dyskinesia (TD); muscle strength and tone are within normal limits   Language No aphasia or dysarthria   Speech:  monotone   Thought Processes logical; normal rate of thoughts; fair abstract reasoning/computation   Thought Associations goal directed   Thought Content free of delusions   Suicidal Ideations contracts for safety   Homicidal Ideations none   Mood:  anxious  and depressed   Affect:  mood-congruent   Memory recent  fair   Memory remote:  fair   Concentration/Attention:  distractable   Fund of Knowledge average   Insight:  limited   Reliability fair   Judgment:  limited          VITALS:     Patient Vitals for the past 24 hrs:   Temp Pulse Resp BP SpO2   07/17/18 1207 98.2 °F (36.8 °C) 79 16 99/67 100 %   07/17/18 0813 97.5 °F (36.4 °C) 84 16 106/72 98 %   07/17/18 0200 97.9 °F (36.6 °C) 87 18 101/70 98 %   07/17/18 0033 98.2 °F (36.8 °C) 90 18 112/68 99 %   07/16/18 2212 98.1 °F (36.7 °C) 89 18 110/74 99 %   07/16/18 1815 98 °F (36.7 °C) (!) 139 16 128/83 95 %     Wt Readings from Last 3 Encounters:   07/16/18 63 kg (138 lb 12.8 oz)   06/16/17 59.4 kg (131 lb)   02/28/17 62.1 kg (137 lb)     Temp Readings from Last 3 Encounters:   07/17/18 98.2 °F (36.8 °C)   06/16/17 98 °F (36.7 °C)   02/28/17 97.7 °F (36.5 °C)     BP Readings from Last 3 Encounters:   07/17/18 99/67   06/16/17 109/63   02/28/17 142/83     Pulse Readings from Last 3 Encounters:   07/17/18 79   06/16/17 (!) 104   02/28/17 100            DATA     LABORATORY DATA:(reviewed/updated 7/17/2018)  Recent Results (from the past 24 hour(s))   CBC WITH AUTOMATED DIFF    Collection Time: 07/16/18  6:55 PM   Result Value Ref Range    WBC 11.5 (H) 3.6 - 11.0 K/uL    RBC 5.30 (H) 3.80 - 5.20 M/uL    HGB 16.0 11.5 - 16.0 g/dL    HCT 47.0 35.0 - 47.0 %    MCV 88.7 80.0 - 99.0 FL    MCH 30.2 26.0 - 34.0 PG    MCHC 34.0 30.0 - 36.5 g/dL    RDW 11.5 11.5 - 14.5 %    PLATELET 881 884 - 669 K/uL    MPV 10.1 8.9 - 12.9 FL    NRBC 0.0 0  WBC    ABSOLUTE NRBC 0.00 0.00 - 0.01 K/uL    NEUTROPHILS 66 32 - 75 %    LYMPHOCYTES 26 12 - 49 %    MONOCYTES 6 5 - 13 %    EOSINOPHILS 1 0 - 7 %    BASOPHILS 0 0 - 1 %    IMMATURE GRANULOCYTES 0 0.0 - 0.5 %    ABS. NEUTROPHILS 7.6 1.8 - 8.0 K/UL    ABS. LYMPHOCYTES 3.0 0.8 - 3.5 K/UL    ABS. MONOCYTES 0.7 0.0 - 1.0 K/UL    ABS. EOSINOPHILS 0.1 0.0 - 0.4 K/UL    ABS. BASOPHILS 0.0 0.0 - 0.1 K/UL    ABS. IMM. GRANS. 0.0 0.00 - 0.04 K/UL    DF AUTOMATED     METABOLIC PANEL, COMPREHENSIVE    Collection Time: 07/16/18  6:55 PM   Result Value Ref Range    Sodium 141 136 - 145 mmol/L    Potassium 4.1 3.5 - 5.1 mmol/L    Chloride 107 97 - 108 mmol/L    CO2 27 21 - 32 mmol/L    Anion gap 7 5 - 15 mmol/L    Glucose 97 65 - 100 mg/dL    BUN 5 (L) 6 - 20 MG/DL    Creatinine 0.79 0.55 - 1.02 MG/DL    BUN/Creatinine ratio 6 (L) 12 - 20      GFR est AA >60 >60 ml/min/1.73m2    GFR est non-AA >60 >60 ml/min/1.73m2    Calcium 9.3 8.5 - 10.1 MG/DL    Bilirubin, total 0.4 0.2 - 1.0 MG/DL    ALT (SGPT) 38 12 - 78 U/L    AST (SGOT) 21 15 - 37 U/L    Alk.  phosphatase 108 45 - 117 U/L    Protein, total 7.9 6.4 - 8.2 g/dL    Albumin 3.9 3.5 - 5.0 g/dL    Globulin 4.0 2.0 - 4.0 g/dL    A-G Ratio 1.0 (L) 1.1 - 2.2     ETHYL ALCOHOL    Collection Time: 07/16/18  6:55 PM   Result Value Ref Range    ALCOHOL(ETHYL),SERUM <80 <58 MG/DL   SALICYLATE    Collection Time: 07/16/18  6:55 PM   Result Value Ref Range    Salicylate level <0.8 (L) 2.8 - 20.0 MG/DL   ACETAMINOPHEN    Collection Time: 07/16/18  6:55 PM   Result Value Ref Range    Acetaminophen level <2 (L) 10 - 30 ug/mL   DRUG SCREEN, URINE    Collection Time: 07/16/18  6:55 PM   Result Value Ref Range    AMPHETAMINES POSITIVE (A) NEG BARBITURATES NEGATIVE  NEG      BENZODIAZEPINES NEGATIVE  NEG      COCAINE NEGATIVE  NEG      METHADONE NEGATIVE  NEG      OPIATES NEGATIVE  NEG      PCP(PHENCYCLIDINE) NEGATIVE  NEG      THC (TH-CANNABINOL) NEGATIVE  NEG      Drug screen comment (NOTE)    URINALYSIS W/MICROSCOPIC    Collection Time: 07/16/18  6:55 PM   Result Value Ref Range    Color YELLOW/STRAW      Appearance CLEAR CLEAR      Specific gravity <1.005 1.003 - 1.030    pH (UA) 7.0 5.0 - 8.0      Protein NEGATIVE  NEG mg/dL    Glucose NEGATIVE  NEG mg/dL    Ketone NEGATIVE  NEG mg/dL    Bilirubin NEGATIVE  NEG      Blood NEGATIVE  NEG      Urobilinogen 0.2 0.2 - 1.0 EU/dL    Nitrites NEGATIVE  NEG      Leukocyte Esterase NEGATIVE  NEG      WBC 0-4 0 - 4 /hpf    RBC 0-5 0 - 5 /hpf    Epithelial cells FEW FEW /lpf    Bacteria NEGATIVE  NEG /hpf   URINE CULTURE HOLD SAMPLE    Collection Time: 07/16/18  6:55 PM   Result Value Ref Range    Urine culture hold        URINE ON HOLD IN MICROBIOLOGY DEPT FOR 3 DAYS. IF UNPRESERVED URINE IS SUBMITTED, IT CANNOT BE USED FOR ADDITIONAL TESTING AFTER 24 HRS, RECOLLECTION WILL BE REQUIRED. TSH 3RD GENERATION    Collection Time: 07/16/18  6:55 PM   Result Value Ref Range    TSH 0.11 (L) 0.36 - 3.74 uIU/mL   HCG URINE, QL. - POC    Collection Time: 07/16/18  7:02 PM   Result Value Ref Range    Pregnancy test,urine (POC) NEGATIVE  NEG       No results found for: VALF2, VALAC, VALP, VALPR, DS6, CRBAM, CRBAMP, CARB2, XCRBAM  No results found for: LITHM   RADIOLOGY REPORTS:(reviewed/updated 7/17/2018)  No results found.        MEDICATIONS     ALL MEDICATIONS:   Current Facility-Administered Medications   Medication Dose Route Frequency    ziprasidone (GEODON) 20 mg in sterile water (preservative free) 1 mL injection  20 mg IntraMUSCular BID PRN    OLANZapine (ZyPREXA) tablet 5 mg  5 mg Oral Q6H PRN    benztropine (COGENTIN) tablet 2 mg  2 mg Oral BID PRN    benztropine (COGENTIN) injection 2 mg  2 mg IntraMUSCular BID PRN    LORazepam (ATIVAN) injection 2 mg  2 mg IntraMUSCular Q4H PRN    zolpidem (AMBIEN) tablet 5 mg  5 mg Oral QHS PRN    acetaminophen (TYLENOL) tablet 650 mg  650 mg Oral Q4H PRN    ibuprofen (MOTRIN) tablet 400 mg  400 mg Oral Q8H PRN    magnesium hydroxide (MILK OF MAGNESIA) 400 mg/5 mL oral suspension 30 mL  30 mL Oral DAILY PRN    nicotine (NICODERM CQ) 21 mg/24 hr patch 1 Patch  1 Patch TransDERmal DAILY PRN    LORazepam (ATIVAN) tablet 0.5 mg  0.5 mg Oral TID PRN    hydrOXYzine HCl (ATARAX) tablet 10 mg  10 mg Oral TID    busPIRone (BUSPAR) tablet 5 mg  5 mg Oral TID    buPROPion SR (WELLBUTRIN SR) tablet 100 mg  100 mg Oral DAILY    amitriptyline (ELAVIL) tablet 25 mg  25 mg Oral QHS    thyroid (Pork) (ARMOUR) tablet 15 mg  15 mg Oral ACB    thyroid (Pork) (ARMOUR) tablet 30 mg  30 mg Oral ACB    topiramate (TOPAMAX) tablet 25 mg  25 mg Oral QHS      SCHEDULED MEDICATIONS:   Current Facility-Administered Medications   Medication Dose Route Frequency    hydrOXYzine HCl (ATARAX) tablet 10 mg  10 mg Oral TID    busPIRone (BUSPAR) tablet 5 mg  5 mg Oral TID    buPROPion SR (WELLBUTRIN SR) tablet 100 mg  100 mg Oral DAILY    amitriptyline (ELAVIL) tablet 25 mg  25 mg Oral QHS    thyroid (Pork) (ARMOUR) tablet 15 mg  15 mg Oral ACB    thyroid (Pork) (ARMOUR) tablet 30 mg  30 mg Oral ACB    topiramate (TOPAMAX) tablet 25 mg  25 mg Oral QHS          ASSESSMENT & PLAN     DIAGNOSES REQUIRING ACTIVE TREATMENT AND MONITORING: (reviewed/updated 7/17/2018)  Patient Active Hospital Problem List:   Anxiety disorder (7/16/2018)    Assessment: unrealistic worry over things she cannot control     Plan- Buspar/ Atarax   Depressive disorder (7/16/2018)    Assessment: sadness, hopelessness, helplessness, poor energy and insomnia     Plan: elavil, wellbutrin           In summary, Bindu Davidson, is a 28 y.o.  female who presents with a severe exacerbation of the principal diagnosis of Anxiety disorder  Patient's condition is worsening/not improving/not stable . Patient requires continued inpatient hospitalization for further stabilization, safety monitoring and medication management. I will continue to coordinate the provision of individual, milieu, occupational, group, and substance abuse therapies to address target symptoms/diagnoses as deemed appropriate for the individual patient. A coordinated, multidisplinary treatment team round was conducted with the patient (this team consists of the nurse, psychiatric unit pharmcist,  and writer). Complete current electronic health record for patient has been reviewed today including consultant notes, ancillary staff notes, nurses and psychiatric tech notes. Suicide risk assessment completed and patient deemed to be of low risk for suicide at this time. The following regarding medications was addressed during rounds with patient:   the risks and benefits of the proposed medication. The patient was given the opportunity to ask questions. Informed consent given to the use of the above medications. Will continue to adjust psychiatric and non-psychiatric medications (see above \"medication\" section and orders section for details) as deemed appropriate & based upon diagnoses and response to treatment. I will continue to order blood tests/labs and diagnostic tests as deemed appropriate and review results as they become available (see orders for details and above listed lab/test results). I will order psychiatric records from previous [facility] hospitals to further elucidate the nature of patient's psychopathology and review once available. I will gather additional collateral information from friends, family and o/p treatment team to further elucidate the nature of patient's psychopathology and baselline level of psychiatric functioning.          I certify that this patient's inpatient psychiatric hospital services furnished since the previous certification were, and continue to be, required for treatment that could reasonably be expected to improve the patient's condition, or for diagnostic study, and that the patient continues to need, on a daily basis, active treatment furnished directly by or requiring the supervision of inpatient psychiatric facility personnel. In addition, the hospital records show that services furnished were intensive treatment services, admission or related services, or equivalent services.     EXPECTED DISCHARGE DATE/DAY: TBD     DISPOSITION: Home       Signed By:   Claudetta Bi, MD  7/17/2018

## 2018-07-17 NOTE — ED NOTES
Patient escorted to bathroom. Voided x1    2215  Healthy choice given. [id number] at bedside. 2345 Attempt to call report Lenard RN will call back. Patient sleep    0030 Patient sleeping    0032 TRANSFER - OUT REPORT:    Verbal report given to Jonah(name) on Martha Davidson  being transferred to (unit) for routine progression of care       Report consisted of patients Situation, Background, Assessment and   Recommendations(SBAR). Information from the following report(s) SBAR was reviewed with the receiving nurse. Lines:       Opportunity for questions and clarification was provided.       Patient transported with:   Registered Nurse

## 2018-07-17 NOTE — PROGRESS NOTES
Problem: Depressed Mood (Adult/Pediatric)  Goal: *STG: Participates in treatment plan  Outcome: Progressing Towards Goal  Pt has been out on the unit interacting well with others. Pt pleasant and cooperative. Pt compliant with meal and meds. Pt did attend AA recovery group. Pt attended coping skills group.

## 2018-07-17 NOTE — BH NOTES
Primary Nurse Ulises Hanks RN and Loren Foote RN performed a dual skin assessment on this patient: pt has a navel piercing, tattoo to right thigh, rash to right arm (treated with antibiotics 1 month ago), and inset bite to right rib cage (pt states it was a tick). No other impairments noted.   Calos score is 23

## 2018-07-17 NOTE — INTERDISCIPLINARY ROUNDS
Behavioral Health Interdisciplinary Rounds     Patient Name: Yessenia Dunlap  Age: 28 y.o. Room/Bed:  729/  Primary Diagnosis: <principal problem not specified>   Admission Status: Voluntary     Readmission within 30 days:   Power of  in place: no  Patient requires a blocked bed: no          Reason for blocked bed:     VTE Prophylaxis: Not indicated    Mobility needs/Fall risk: no    Nutritional Plan: no  Consults:          Labs/Testing due today?: no    Sleep hours:        Participation in Care/Groups:  no  Medication Compliant?: n/a  PRNS (last 24 hours): None    Restraints (last 24 hours):  no     CIWA (range last 24 hours):     COWS (range last 24 hours):      Alcohol screening (AUDIT) completed -         If applicable, date SBIRT discussed in treatment team AND documented:     Tobacco - patient is a smoker:    Illegal Drugs use:      24 hour chart check complete:      Patient goal(s) for today: attend all groups  Treatment team focus/goals: psychosocial  Progress note: Pt states she has multiple prescribers and has been taking Ativan for many years. LOS:  0  Expected LOS: TBD    Financial concerns/prescription coverage: Uninsured   Date of last family contact: None     Family requesting physician contact today:  No  Discharge plan: Return home when stable for discharge  Guns in the home: No        Outpatient provider(s): Dr. Cristina Maher (PCP);  Rose Powers (therapist)    Participating treatment team members: Yessenia Dunlap, CRYSTAL Colindres; Dr. Clovis Sal MD; Emili Kohler RN; Nathalie Olivares, PharmD; Pharmacy student

## 2018-07-17 NOTE — ED PROVIDER NOTES
HPI Comments: 27 YO F with hx of anxiety, depression, hypothyroid, PCOS, ADHD, and endometritis here for mental health evaluation. States she has been having worsening depression and was started on Cymbalta last week by PCP. Evaluated today for suicidal ideations and hallucinations. Denies plan. No additional medications taken per pt. Non smoker. Denies fever, cough, CP, SOB, abd pain, flank pain, urinary symptoms. Patient is a 28 y.o. female presenting with mental health disorder. The history is provided by the patient. Mental Health Problem    This is a recurrent problem. The current episode started more than 2 days ago. The problem has been gradually worsening. Associated symptoms include hallucinations. Pertinent negatives include no seizures. Mental status baseline is normal.         Past Medical History:   Diagnosis Date    ADHD     Delayed gastric emptying     Endometritis     Hypothyroid     PCOS (polycystic ovarian syndrome)     Psychiatric disorder     anxiety depression       Past Surgical History:   Procedure Laterality Date    HX  SECTION      HX CHOLECYSTECTOMY           History reviewed. No pertinent family history. Social History     Social History    Marital status: SINGLE     Spouse name: N/A    Number of children: N/A    Years of education: N/A     Occupational History    Not on file. Social History Main Topics    Smoking status: Never Smoker    Smokeless tobacco: Never Used    Alcohol use No    Drug use: No    Sexual activity: Not on file     Other Topics Concern    Not on file     Social History Narrative         ALLERGIES: Amoxicillin and Sulfa (sulfonamide antibiotics)    Review of Systems   Constitutional: Negative for activity change and fever. HENT: Negative for facial swelling. Eyes: Negative for discharge. Respiratory: Negative for cough. Cardiovascular: Negative for leg swelling.    Gastrointestinal: Negative for abdominal distention. Skin: Negative for color change. Neurological: Negative for seizures and syncope. Psychiatric/Behavioral: Positive for hallucinations and suicidal ideas. Negative for behavioral problems. Vitals:    07/16/18 1815 07/16/18 2212   BP: 128/83 110/74   Pulse: (!) 139 89   Resp: 16 18   Temp: 98 °F (36.7 °C) 98.1 °F (36.7 °C)   SpO2: 95% 99%   Weight: 63 kg (138 lb 12.8 oz)    Height: 5' (1.524 m)             Physical Exam   Constitutional: She is oriented to person, place, and time. She appears well-developed and well-nourished. No distress. HENT:   Head: Normocephalic and atraumatic. Right Ear: External ear normal.   Left Ear: External ear normal.   Nose: Nose normal.   Mouth/Throat: Oropharynx is clear and moist.   Eyes: Conjunctivae and EOM are normal. Pupils are equal, round, and reactive to light. Right eye exhibits no discharge. Left eye exhibits no discharge. Neck: Normal range of motion. Neck supple. Cardiovascular: Normal rate, regular rhythm, normal heart sounds and intact distal pulses. Pulmonary/Chest: Effort normal and breath sounds normal.   Abdominal: Soft. Bowel sounds are normal. She exhibits no distension. There is no tenderness. There is no rebound and no guarding. Musculoskeletal: Normal range of motion. She exhibits no edema or tenderness. Neurological: She is alert and oriented to person, place, and time. No cranial nerve deficit. Coordination normal.   Skin: Skin is warm and dry. No rash noted. Psychiatric: Her behavior is normal. Judgment and thought content normal. She exhibits a depressed mood. Nursing note and vitals reviewed. MDM  Number of Diagnoses or Management Options  Depression, unspecified depression type:      Amount and/or Complexity of Data Reviewed  Clinical lab tests: ordered and reviewed  Discuss the patient with other providers: yes          ED Course       Procedures    [facility] in to see pt; pt to be admitted to psych.  Li MANRIQUE Penny Rosenthalma    Pt medically cleared. CHANTELLE Urrutia    Discussed case with attending Physician. Agrees with care and plan.  CHANTELLE Urrutia
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
## 2018-07-17 NOTE — H&P
INITIAL PSYCHIATRIC EVALUATION            IDENTIFICATION:    Patient Name  Dayday Jolley   Date of Birth 1990   [facility] [id number]   Medical Record Number  [id number]      Age  28 y.o. PCP Angie Callahan MD   Admit date:  7/16/2018    Room Number  729/02  @ [facility]   Date of Service  7/17/2018            HISTORY         REASON FOR HOSPITALIZATION:  CC: \" hallucinations, depression and anxiety \". Pt admitted under a voluntary basis for severe depression with suicidal ideations  NO INTENT OR PLAN  proving to be an imminent danger to self and an inability to care for self. HISTORY OF PRESENT ILLNESS:    The patient, Dayday Jolley, is a 28 y.o. WHITE OR  female with a past psychiatric history significant for anxiety d/o , who presents at this time with complaints of (and/or evidence of) the following emotional symptoms: depression and suicidal thoughts/threats. Additional symptomatology include anxiety. The above symptoms have been present for years . These symptoms are of severe severity. These symptoms are constant  in nature. The patient's condition has been precipitated by overuse of amphetamines and psychosocial stressors (concurrent use of benzodiazepiens  ). Patient's condition made worse by reatment noncompliance. UDS: +BZP and AMPH; BAL=0. ALLERGIES:   Allergies   Allergen Reactions    Amoxicillin Rash    Sulfa (Sulfonamide Antibiotics) Rash      MEDICATIONS PRIOR TO ADMISSION:   Prescriptions Prior to Admission   Medication Sig    cholecalciferol, VITAMIN D3, (VITAMIN D3) 5,000 unit tab tablet Take 5,000 Units by mouth daily.  niacin ER (NIASPAN) 500 mg tablet Take 500 mg by mouth nightly.  topiramate ER (TROKENDI XR) 25 mg capsule Take 25 mg by mouth daily. Indications: MIGRAINE PREVENTION    thyroid, Pork, (ARMOUR THYROID) 15 mg tablet Take 15 mg by mouth Daily (before breakfast).     thyroid, Pork, (ARMOUR THYROID) 30 mg tablet Take 30 mg by mouth Daily (before breakfast).  cyanocobalamin (VITAMIN B-12) 1,000 mcg tablet Take 1,000 mcg by mouth daily.  dextroamphetamine-amphetamine (ADDERALL) 20 mg tablet Take 20 mg by mouth three (3) times daily.  LORazepam (ATIVAN) 1 mg tablet Take 1 mg by mouth two (2) times daily as needed for Anxiety.  fluticasone (FLONASE) 50 mcg/actuation nasal spray 2 Sprays by Both Nostrils route daily. PAST MEDICAL HISTORY:   Past Medical History:   Diagnosis Date    ADHD     Delayed gastric emptying     Endometritis     Hypothyroid     PCOS (polycystic ovarian syndrome)     Psychiatric disorder     anxiety depression     Past Surgical History:   Procedure Laterality Date    HX  SECTION      HX CHOLECYSTECTOMY        SOCIAL HISTORY:    Social History     Social History    Marital status: SINGLE     Spouse name: N/A    Number of children: N/A    Years of education: N/A     Occupational History    Not on file. Social History Main Topics    Smoking status: Never Smoker    Smokeless tobacco: Never Used    Alcohol use No    Drug use: No    Sexual activity: Not on file     Other Topics Concern    Not on file     Social History Narrative    28YEAR OLD  female admitted voluntarily for depression and anxiety, PT has been on adderal and has had hallucinatory experiences. She was placed on Aderal in  when she was \"working full time and also in graduate school for neurobiology. Pt has been unemployed since May 2018 and has  from her boyfriend as well. She lives with her 3year old daughter and her mother. FAMILY HISTORY:    History reviewed. No pertinent family history. REVIEW OF SYSTEMS:   Psychological ROS: positive for - behavioral disorder  Respiratory ROS: no cough, shortness of breath, or wheezing  Cardiovascular ROS: no chest pain or dyspnea on exertion  Pertinent items are noted in the History of Present Illness.   All other Systems reviewed and are considered negative. MENTAL STATUS EXAM & VITALS     MENTAL STATUS EXAM (MSE):    MSE FINDINGS ARE WITHIN NORMAL LIMITS (WNL) UNLESS OTHERWISE STATED BELOW. ( ALL OF THE BELOW CATEGORIES OF THE MSE HAVE BEEN REVIEWED (reviewed 7/17/2018) AND UPDATED AS DEEMED APPROPRIATE )  General Presentation age appropriate, cooperative   Orientation oriented to time, place and person   Vital Signs  See below (reviewed 7/17/2018); Vital Signs (BP, Pulse, & Temp) are within normal limits if not listed below.    Gait and Station Stable/steady, no ataxia   Musculoskeletal System No extrapyramidal symptoms (EPS); no abnormal muscular movements or Tardive Dyskinesia (TD); muscle strength and tone are within normal limits   Language No aphasia or dysarthria   Speech:  monotone   Thought Processes logical; slow rate of thoughts; poor abstract reasoning/computation   Thought Associations goal directed   Thought Content free of delusions and not internally preoccupied   Suicidal Ideations contracts for safety   Homicidal Ideations none   Mood:  anxious    Affect:  mood-congruent   Memory recent  fair   Memory remote:  fair   Concentration/Attention:  hypervigilance   Fund of Knowledge average   Insight:  poor   Reliability poor   Judgment:  limited          VITALS:     Patient Vitals for the past 24 hrs:   Temp Pulse Resp BP SpO2   07/17/18 0813 97.5 °F (36.4 °C) 84 16 106/72 98 %   07/17/18 0200 97.9 °F (36.6 °C) 87 18 101/70 98 %   07/17/18 0033 98.2 °F (36.8 °C) 90 18 112/68 99 %   07/16/18 2212 98.1 °F (36.7 °C) 89 18 110/74 99 %   07/16/18 1815 98 °F (36.7 °C) (!) 139 16 128/83 95 %     Wt Readings from Last 3 Encounters:   07/16/18 63 kg (138 lb 12.8 oz)   06/16/17 59.4 kg (131 lb)   02/28/17 62.1 kg (137 lb)     Temp Readings from Last 3 Encounters:   07/17/18 97.5 °F (36.4 °C)   06/16/17 98 °F (36.7 °C)   02/28/17 97.7 °F (36.5 °C)     BP Readings from Last 3 Encounters:   07/17/18 106/72   06/16/17 109/63   02/28/17 142/83 Pulse Readings from Last 3 Encounters:   07/17/18 84   06/16/17 (!) 104   02/28/17 100            DATA     LABORATORY DATA:  Labs Reviewed   CBC WITH AUTOMATED DIFF - Abnormal; Notable for the following:        Result Value    WBC 11.5 (*)     RBC 5.30 (*)     All other components within normal limits   METABOLIC PANEL, COMPREHENSIVE - Abnormal; Notable for the following:     BUN 5 (*)     BUN/Creatinine ratio 6 (*)     A-G Ratio 1.0 (*)     All other components within normal limits   SALICYLATE - Abnormal; Notable for the following:     Salicylate level <9.4 (*)     All other components within normal limits   ACETAMINOPHEN - Abnormal; Notable for the following:     Acetaminophen level <2 (*)     All other components within normal limits   DRUG SCREEN, URINE - Abnormal; Notable for the following:     AMPHETAMINES POSITIVE (*)     All other components within normal limits   TSH 3RD GENERATION - Abnormal; Notable for the following:     TSH 0.11 (*)     All other components within normal limits   URINE CULTURE HOLD SAMPLE   ETHYL ALCOHOL   URINALYSIS W/MICROSCOPIC   SAMPLES BEING HELD   HCG URINE, QL. - POC     Admission on 07/16/2018   Component Date Value Ref Range Status    WBC 07/16/2018 11.5* 3.6 - 11.0 K/uL Final    RBC 07/16/2018 5.30* 3.80 - 5.20 M/uL Final    HGB 07/16/2018 16.0  11.5 - 16.0 g/dL Final    HCT 07/16/2018 47.0  35.0 - 47.0 % Final    MCV 07/16/2018 88.7  80.0 - 99.0 FL Final    MCH 07/16/2018 30.2  26.0 - 34.0 PG Final    MCHC 07/16/2018 34.0  30.0 - 36.5 g/dL Final    RDW 07/16/2018 11.5  11.5 - 14.5 % Final    PLATELET 20/48/6239 016  150 - 400 K/uL Final    MPV 07/16/2018 10.1  8.9 - 12.9 FL Final    NRBC 07/16/2018 0.0  0  WBC Final    ABSOLUTE NRBC 07/16/2018 0.00  0.00 - 0.01 K/uL Final    NEUTROPHILS 07/16/2018 66  32 - 75 % Final    LYMPHOCYTES 07/16/2018 26  12 - 49 % Final    MONOCYTES 07/16/2018 6  5 - 13 % Final    EOSINOPHILS 07/16/2018 1  0 - 7 % Final  BASOPHILS 07/16/2018 0  0 - 1 % Final    IMMATURE GRANULOCYTES 07/16/2018 0  0.0 - 0.5 % Final    ABS. NEUTROPHILS 07/16/2018 7.6  1.8 - 8.0 K/UL Final    ABS. LYMPHOCYTES 07/16/2018 3.0  0.8 - 3.5 K/UL Final    ABS. MONOCYTES 07/16/2018 0.7  0.0 - 1.0 K/UL Final    ABS. EOSINOPHILS 07/16/2018 0.1  0.0 - 0.4 K/UL Final    ABS. BASOPHILS 07/16/2018 0.0  0.0 - 0.1 K/UL Final    ABS. IMM. GRANS. 07/16/2018 0.0  0.00 - 0.04 K/UL Final    DF 07/16/2018 AUTOMATED    Final    Sodium 07/16/2018 141  136 - 145 mmol/L Final    Potassium 07/16/2018 4.1  3.5 - 5.1 mmol/L Final    Chloride 07/16/2018 107  97 - 108 mmol/L Final    CO2 07/16/2018 27  21 - 32 mmol/L Final    Anion gap 07/16/2018 7  5 - 15 mmol/L Final    Glucose 07/16/2018 97  65 - 100 mg/dL Final    BUN 07/16/2018 5* 6 - 20 MG/DL Final    Creatinine 07/16/2018 0.79  0.55 - 1.02 MG/DL Final    BUN/Creatinine ratio 07/16/2018 6* 12 - 20   Final    GFR est AA 07/16/2018 >60  >60 ml/min/1.73m2 Final    GFR est non-AA 07/16/2018 >60  >60 ml/min/1.73m2 Final    Calcium 07/16/2018 9.3  8.5 - 10.1 MG/DL Final    Bilirubin, total 07/16/2018 0.4  0.2 - 1.0 MG/DL Final    ALT (SGPT) 07/16/2018 38  12 - 78 U/L Final    AST (SGOT) 07/16/2018 21  15 - 37 U/L Final    Alk.  phosphatase 07/16/2018 108  45 - 117 U/L Final    Protein, total 07/16/2018 7.9  6.4 - 8.2 g/dL Final    Albumin 07/16/2018 3.9  3.5 - 5.0 g/dL Final    Globulin 07/16/2018 4.0  2.0 - 4.0 g/dL Final    A-G Ratio 07/16/2018 1.0* 1.1 - 2.2   Final    ALCOHOL(ETHYL),SERUM 07/16/2018 <10  <10 MG/DL Final    Salicylate level 58/06/7618 <1.7* 2.8 - 20.0 MG/DL Final    Acetaminophen level 07/16/2018 <2* 10 - 30 ug/mL Final    AMPHETAMINES 07/16/2018 POSITIVE* NEG   Final    BARBITURATES 07/16/2018 NEGATIVE   NEG   Final    BENZODIAZEPINES 07/16/2018 NEGATIVE   NEG   Final    COCAINE 07/16/2018 NEGATIVE   NEG   Final    METHADONE 07/16/2018 NEGATIVE   NEG   Final    OPIATES 07/16/2018 NEGATIVE   NEG   Final    PCP(PHENCYCLIDINE) 07/16/2018 NEGATIVE   NEG   Final    THC (TH-CANNABINOL) 07/16/2018 NEGATIVE   NEG   Final    Drug screen comment 07/16/2018 (NOTE)   Final    Color 07/16/2018 YELLOW/STRAW    Final    Appearance 07/16/2018 CLEAR  CLEAR   Final    Specific gravity 07/16/2018 <1.005  1.003 - 1.030 Final    pH (UA) 07/16/2018 7.0  5.0 - 8.0   Final    Protein 07/16/2018 NEGATIVE   NEG mg/dL Final    Glucose 07/16/2018 NEGATIVE   NEG mg/dL Final    Ketone 07/16/2018 NEGATIVE   NEG mg/dL Final    Bilirubin 07/16/2018 NEGATIVE   NEG   Final    Blood 07/16/2018 NEGATIVE   NEG   Final    Urobilinogen 07/16/2018 0.2  0.2 - 1.0 EU/dL Final    Nitrites 07/16/2018 NEGATIVE   NEG   Final    Leukocyte Esterase 07/16/2018 NEGATIVE   NEG   Final    WBC 07/16/2018 0-4  0 - 4 /hpf Final    RBC 07/16/2018 0-5  0 - 5 /hpf Final    Epithelial cells 07/16/2018 FEW  FEW /lpf Final    Bacteria 07/16/2018 NEGATIVE   NEG /hpf Final    Urine culture hold 07/16/2018 URINE ON HOLD IN MICROBIOLOGY DEPT FOR 3 DAYS. IF UNPRESERVED URINE IS SUBMITTED, IT CANNOT BE USED FOR ADDITIONAL TESTING AFTER 24 HRS, RECOLLECTION WILL BE REQUIRED. Final    Pregnancy test,urine (POC) 07/16/2018 NEGATIVE   NEG   Final    TSH 07/16/2018 0.11* 0.36 - 3.74 uIU/mL Final        RADIOLOGY REPORTS:  No results found for this or any previous visit. No results found.            MEDICATIONS       ALL MEDICATIONS  Current Facility-Administered Medications   Medication Dose Route Frequency    ziprasidone (GEODON) 20 mg in sterile water (preservative free) 1 mL injection  20 mg IntraMUSCular BID PRN    OLANZapine (ZyPREXA) tablet 5 mg  5 mg Oral Q6H PRN    benztropine (COGENTIN) tablet 2 mg  2 mg Oral BID PRN    benztropine (COGENTIN) injection 2 mg  2 mg IntraMUSCular BID PRN    LORazepam (ATIVAN) injection 2 mg  2 mg IntraMUSCular Q4H PRN    zolpidem (AMBIEN) tablet 5 mg  5 mg Oral QHS PRN    acetaminophen (TYLENOL) tablet 650 mg  650 mg Oral Q4H PRN    ibuprofen (MOTRIN) tablet 400 mg  400 mg Oral Q8H PRN    magnesium hydroxide (MILK OF MAGNESIA) 400 mg/5 mL oral suspension 30 mL  30 mL Oral DAILY PRN    nicotine (NICODERM CQ) 21 mg/24 hr patch 1 Patch  1 Patch TransDERmal DAILY PRN    LORazepam (ATIVAN) tablet 0.5 mg  0.5 mg Oral TID PRN    hydrOXYzine HCl (ATARAX) tablet 10 mg  10 mg Oral TID    busPIRone (BUSPAR) tablet 5 mg  5 mg Oral TID    buPROPion SR (WELLBUTRIN SR) tablet 100 mg  100 mg Oral DAILY    amitriptyline (ELAVIL) tablet 25 mg  25 mg Oral QHS      SCHEDULED MEDICATIONS  Current Facility-Administered Medications   Medication Dose Route Frequency    hydrOXYzine HCl (ATARAX) tablet 10 mg  10 mg Oral TID    busPIRone (BUSPAR) tablet 5 mg  5 mg Oral TID    buPROPion SR (WELLBUTRIN SR) tablet 100 mg  100 mg Oral DAILY    amitriptyline (ELAVIL) tablet 25 mg  25 mg Oral QHS                ASSESSMENT & PLAN        The patient, Bree Tipton, is a 28 y.o.  female who presents at this time for treatment of the following diagnoses:  Patient Active Hospital Problem List:   Depression  (7/16/2018)    Assessment: sadness, hopelessness, helplesness, poor energy and insomnia     Plan: wellbutrin, Elavil   Anxiety disorder (7/16/2018)    Assessment: uneccessary constant worry     Plan: buspar/ Atarax               A coordinated, multidisplinary treatment team (includes the nurse, unit pharmcist,  and writer) round was conducted for this initial evaluation with the patient present. The following regarding medications was addressed during rounds with patient: wellbutrin   the risks and benefits of the proposed medication. The patient was given the opportunity to ask questions. Informed consent given to the use of the above medications.      I will continue to adjust psychiatric and non-psychiatric medications (see above \"medication\" section and orders section for details) as deemed appropriate & based upon diagnoses and response to treatment. I have reviewed admission (and previous/old) labs and medical tests in the EHR and or transferring hospital documents. I will continue to order blood tests/labs and diagnostic tests as deemed appropriate and review results as they become available (see orders for details). I have reviewed old psychiatric and medical records available in the EHR. I Will order additional psychiatric records from other institutions to further elucidate the nature of patient's psychopathology and review once available. I will gather additional collateral information from friends, family and o/p treatment team to further elucidate the nature of patient's psychopathology and baselline level of psychiatric functioning. ESTIMATED LENGTH OF STAY:    5-7 days        STRENGTHS:has a place to live and has family support.                                         SIGNED:    Taz Beth MD  7/17/2018

## 2018-07-17 NOTE — PROGRESS NOTES
Admission Medication Reconciliation:    Information obtained from:  [facility]/Patient interview/RxQuery    Comments/Recommendations: Updated PTA meds/reviewed patient's allergies. 1)  Patient states that she has been on antidepressants in the past. Reports use of sertraline and escitalopram in the past but both caused her to have a rash. She reports taking lorazepam occasionally for ~5 years (the same time Adderall was started); however, she has only filled lorazepam 3 times in the last 24 months. She denies taking any unprescribed benzodiazepine and has not concerns of withdrawal. Duloxetine was added and filled on 7/10 but patient reports feeling \"catatonic\" on medication. Lorazepam was filled on 7/10 for #60 for 30 day supply. Patient reports taking 1 daily for ~1 week. 2)  Medication changes (since last review): Added  - duloxetine 30mg daily  - Loryna oral contraceptive pills    Adjusted  - cholecalciferol 5000 units daily (from 1000 units)  - niacin ER 500mg QHS (from IR formulation)  - topiramate ER (Trokendi) 25mg QHS (from IR formulation)    Removed  - none     Allergies:  Amoxicillin and Sulfa (sulfonamide antibiotics)    Significant PMH/Disease States:   Past Medical History:   Diagnosis Date    ADHD     Delayed gastric emptying     Endometritis     Hypothyroid     PCOS (polycystic ovarian syndrome)     Psychiatric disorder     anxiety depression     Chief Complaint for this Admission:    Chief Complaint   Patient presents with   [address] Problem     Prior to Admission Medications:   Prior to Admission Medications   Prescriptions Last Dose Informant Patient Reported? Taking? LORazepam (ATIVAN) 1 mg tablet 7/16/2018 at 1130  Yes Yes   Sig: Take 1 mg by mouth two (2) times daily as needed for Anxiety. cholecalciferol, VITAMIN D3, (VITAMIN D3) 5,000 unit tab tablet 7/16/2018  Yes Yes   Sig: Take 5,000 Units by mouth daily.    cyanocobalamin (VITAMIN B-12) 1,000 mcg tablet 6/16/2018 at Unknown time  Yes Yes   Sig: Take 1,000 mcg by mouth daily. dextroamphetamine-amphetamine (ADDERALL) 20 mg tablet 2018 at 0930  Yes Yes   Sig: Take 20 mg by mouth three (3) times daily. fluticasone (FLONASE) 50 mcg/actuation nasal spray Unknown at Unknown time  Yes No   Si Sprays by Both Nostrils route daily. niacin ER (NIASPAN) 500 mg tablet 7/15./2018 at 2100  Yes Yes   Sig: Take 500 mg by mouth nightly. thyroid, Pork, (ARMOUR THYROID) 15 mg tablet 7/15/2018 at 0900  Yes Yes   Sig: Take 15 mg by mouth Daily (before breakfast). thyroid, Pork, (ARMOUR THYROID) 30 mg tablet 2018 at 0900  Yes Yes   Sig: Take 30 mg by mouth Daily (before breakfast). topiramate ER (TROKENDI XR) 25 mg capsule   Yes Yes   Sig: Take 25 mg by mouth daily.  Indications: MIGRAINE PREVENTION      Facility-Administered Medications: None     Shahzad Brooks, PharmD, BCPP, [facility] Specialist, [facility]

## 2018-07-18 PROCEDURE — 65220000003 HC RM SEMIPRIVATE PSYCH

## 2018-07-18 PROCEDURE — 74011250637 HC RX REV CODE- 250/637: Performed by: PSYCHIATRY & NEUROLOGY

## 2018-07-18 RX ORDER — HYDROXYZINE 25 MG/1
25 TABLET, FILM COATED ORAL 3 TIMES DAILY
Status: DISCONTINUED | OUTPATIENT
Start: 2018-07-18 | End: 2018-07-18

## 2018-07-18 RX ORDER — DEXTROAMPHETAMINE SACCHARATE, AMPHETAMINE ASPARTATE, DEXTROAMPHETAMINE SULFATE AND AMPHETAMINE SULFATE 1.25; 1.25; 1.25; 1.25 MG/1; MG/1; MG/1; MG/1
5 TABLET ORAL 3 TIMES DAILY
Status: DISCONTINUED | OUTPATIENT
Start: 2018-07-18 | End: 2018-07-18

## 2018-07-18 RX ORDER — BUPROPION HYDROCHLORIDE 150 MG/1
150 TABLET, EXTENDED RELEASE ORAL DAILY
Status: DISCONTINUED | OUTPATIENT
Start: 2018-07-19 | End: 2018-07-18

## 2018-07-18 RX ORDER — DEXTROAMPHETAMINE SACCHARATE, AMPHETAMINE ASPARTATE MONOHYDRATE, DEXTROAMPHETAMINE SULFATE AND AMPHETAMINE SULFATE 1.25; 1.25; 1.25; 1.25 MG/1; MG/1; MG/1; MG/1
5 CAPSULE, EXTENDED RELEASE ORAL 3 TIMES DAILY
Status: DISCONTINUED | OUTPATIENT
Start: 2018-07-18 | End: 2018-07-18 | Stop reason: DRUGHIGH

## 2018-07-18 RX ORDER — MELATONIN
5000 DAILY
Status: DISCONTINUED | OUTPATIENT
Start: 2018-07-19 | End: 2018-07-19 | Stop reason: HOSPADM

## 2018-07-18 RX ORDER — MIRTAZAPINE 15 MG/1
15 TABLET, FILM COATED ORAL
Status: DISCONTINUED | OUTPATIENT
Start: 2018-07-18 | End: 2018-07-19 | Stop reason: HOSPADM

## 2018-07-18 RX ORDER — AMITRIPTYLINE HYDROCHLORIDE 50 MG/1
50 TABLET, FILM COATED ORAL
Status: DISCONTINUED | OUTPATIENT
Start: 2018-07-18 | End: 2018-07-18

## 2018-07-18 RX ORDER — LANOLIN ALCOHOL/MO/W.PET/CERES
500 CREAM (GRAM) TOPICAL
Status: DISCONTINUED | OUTPATIENT
Start: 2018-07-18 | End: 2018-07-19 | Stop reason: HOSPADM

## 2018-07-18 RX ORDER — BUSPIRONE HYDROCHLORIDE 10 MG/1
10 TABLET ORAL 3 TIMES DAILY
Status: DISCONTINUED | OUTPATIENT
Start: 2018-07-18 | End: 2018-07-18

## 2018-07-18 RX ORDER — DOCUSATE SODIUM 100 MG/1
100 CAPSULE, LIQUID FILLED ORAL DAILY
Status: DISCONTINUED | OUTPATIENT
Start: 2018-07-19 | End: 2018-07-19 | Stop reason: HOSPADM

## 2018-07-18 RX ORDER — LANOLIN ALCOHOL/MO/W.PET/CERES
1000 CREAM (GRAM) TOPICAL DAILY
Status: DISCONTINUED | OUTPATIENT
Start: 2018-07-19 | End: 2018-07-19 | Stop reason: HOSPADM

## 2018-07-18 RX ORDER — FLUTICASONE PROPIONATE 50 MCG
2 SPRAY, SUSPENSION (ML) NASAL DAILY
Status: DISCONTINUED | OUTPATIENT
Start: 2018-07-19 | End: 2018-07-19 | Stop reason: HOSPADM

## 2018-07-18 RX ORDER — DEXTROAMPHETAMINE SACCHARATE, AMPHETAMINE ASPARTATE, DEXTROAMPHETAMINE SULFATE AND AMPHETAMINE SULFATE 2.5; 2.5; 2.5; 2.5 MG/1; MG/1; MG/1; MG/1
20 TABLET ORAL 3 TIMES DAILY
Status: DISCONTINUED | OUTPATIENT
Start: 2018-07-18 | End: 2018-07-19 | Stop reason: HOSPADM

## 2018-07-18 RX ORDER — SENNOSIDES 8.6 MG/1
1 TABLET ORAL DAILY PRN
Status: DISCONTINUED | OUTPATIENT
Start: 2018-07-18 | End: 2018-07-19 | Stop reason: HOSPADM

## 2018-07-18 RX ADMIN — MIRTAZAPINE 15 MG: 15 TABLET, FILM COATED ORAL at 21:20

## 2018-07-18 RX ADMIN — TOPIRAMATE 25 MG: 25 TABLET, FILM COATED ORAL at 21:20

## 2018-07-18 RX ADMIN — THYROID, PORCINE 45 MG: 60 TABLET ORAL at 07:27

## 2018-07-18 RX ADMIN — HYDROXYZINE HYDROCHLORIDE 10 MG: 10 TABLET, FILM COATED ORAL at 09:42

## 2018-07-18 RX ADMIN — BUPROPION HYDROCHLORIDE 100 MG: 100 TABLET, FILM COATED, EXTENDED RELEASE ORAL at 09:42

## 2018-07-18 RX ADMIN — ZOLPIDEM TARTRATE 5 MG: 5 TABLET ORAL at 23:30

## 2018-07-18 RX ADMIN — BUSPIRONE HYDROCHLORIDE 5 MG: 5 TABLET ORAL at 09:42

## 2018-07-18 RX ADMIN — DEXTROAMPHETAMINE SACCHARATE, AMPHETAMINE ASPARTATE, DEXTROAMPHETAMINE SULFATE AND AMPHETAMINE SULFATE 20 MG: 2.5; 2.5; 2.5; 2.5 TABLET ORAL at 16:11

## 2018-07-18 RX ADMIN — Medication 500 MG: at 21:21

## 2018-07-18 RX ADMIN — SENNA 8.6 MG: 8.6 TABLET, COATED ORAL at 16:12

## 2018-07-18 NOTE — PROGRESS NOTES
Problem: Depressed Mood (Adult/Pediatric)  Goal: *STG: Complies with medication therapy  Outcome: Progressing Towards Goal  1530: Greeted patient on unit in room. Appears in no acute distress. Will continue to monitor on Q 15 minute safety checks.

## 2018-07-18 NOTE — BH NOTES
GROUP THERAPY PROGRESS NOTE    Brittany Davidson is participating in [employer]. Group time: 30 minutes    Personal goal for participation: daily progress    Goal orientation: personal    Group therapy participation: active    Therapeutic interventions reviewed and discussed:     Impression of participation: The patient did a fantastic job of interpreting the poem which was included as a part of the group session.

## 2018-07-18 NOTE — PROGRESS NOTES
GROUP THERAPY PROGRESS NOTE      Raven Nyasia Davidson was present for medication education group. GROUP TIME: 60 minutes, Wednesday 14:00-15:00    PERSONAL GOAL FOR PARTICIPATION: To be present for group, participate in discussion and answer patient-directed questions. THERAPEUTIC INTERVENTIONS REVIEWED AND DISCUSSED: The following topic was presented: Depression. Signs and symptoms of depression were discussed (in regards to target symptoms for medications). The mechanism of action was discussed for the most common antidepressant classes. Expectations of medications were discussed including when to expect response, potential side effects and ways to prevent/remedy them. Patients were encouraged to advocate for themselves, create healthy therapeutic relationships with providers (particularly once outpatient) and to ask questions when needed. IMPRESSION OF PARTICIPATION:   Ms. Michael Rae was an active participant throughout the medication education group. She answered questions asked of the group. She was particularly interested in the specifics of mechanisms and asked a great question regarding the use of PET scans clinically. She was respectful to both myself and her peers. Her mood continues to remain depressed or low.     Mike Downing, PharmD, BCPP, [facility], [facility] [place]

## 2018-07-18 NOTE — BH NOTES
GROUP THERAPY PROGRESS NOTE    Albina Davidson participated in a morning Process Group on the General Unit with a focus identifying feelings,   planning for the day, and learning more about DBT concepts on \"Emotion Regulation. \"    .  Group time: 55 minutes. Personal goal for participation: To increase the capacity to improve ones mood, set personal goals,   and understand more about basic activities to help regulate emotions. Goal orientation: The patients will be able to identify their feelings and develop a plan for   structuring their day. They were also presented with a summary sheet on emotional regulation,   in regards to focusing on one goal per day, taking physical care of oneself, and recognizing   and/or building positive experiences. The didactic portion of the session focused on these three  concepts; 1) defining and focusing on one goal per day; 2) taking care of ones physical   maintenance and basic needs  sleep, nutrition, and exercise; and 3) finding and building on   positive experiences. Group therapy participation: With prompting, this patient participated in the group. Therapeutic interventions reviewed and discussed: The group members were asked to   identify an emotion they are having and/or let the group know what they want to focus on for the   day as they continue to make discharge plans. The group members reviewed three DBT   suggestions regarding emotional regulation, in regards to focusing on one goal per day, taking   physical care of oneself, and recognizing and/or building positive experiences. It was suggested   that these three concepts can be seen as headings for their list of coping skills. The group   members were also provided worksheets on the topic discussed for their review and use on   their own time.      Impression of participation: The patient said she was feeling \"tired\" because she had gotten  \"poor sleep\" before coming into the hospital. She said her therapist encouraged her to come   into the hospital, because she was having \"trouble getting out of my bed. Dexter Turcios I have depression  and anxiety. ..the medication I was on was not working. \" She denied any Current SI/HI and   displayed no overt psychotic symptoms in this group. Her affect was depressed and anxious. Her mood matched her affect. This was the patient's first process group with the undersigned.

## 2018-07-18 NOTE — INTERDISCIPLINARY ROUNDS
Behavioral Health Interdisciplinary Rounds     Patient Name: Mikie Alicia  Age: 28 y.o. Room/Bed:  729/02  Primary Diagnosis: Anxiety disorder   Admission Status: Voluntary     Readmission within 30 days: no  Power of  in place: no  Patient requires a blocked bed: no          Reason for blocked bed:     VTE Prophylaxis: Not indicated    Mobility needs/Fall risk: no    Nutritional Plan: no  Consults:          Labs/Testing due today?: no    Sleep hours:  6.25      Participation in Care/Groups:  yes  Medication Compliant?: Yes  PRNS (last 24 hours): nicotine patch, ambien  Restraints (last 24 hours):  no     CIWA (range last 24 hours):     COWS (range last 24 hours):      Alcohol screening (AUDIT) completed -   AUDIT Score: 2     If applicable, date SBIRT discussed in treatment team AND documented:     Tobacco - patient is a smoker: Have You Used Tobacco in the Past 30 Days: Yes  Illegal Drugs use: Have You Used Any Illegal Substances Over the Past 12 Months: No    24 hour chart check complete: yes     Patient goal(s) for today: attend all groups  Treatment team focus/goals: Continue to titrate medications  Progress note: Pt ambivalent; states she does not feel like her medications are working     LOS:  1  Expected LOS: TBD    Financial concerns/prescription coverage:  Uninsured  Date of last family contact: None     Family requesting physician contact today:  No  Discharge plan: Return home when stable for discharge  Guns in the home: No        Outpatient provider(s): Dr. Tiffany Copeland (PCP);  Kushal Ocasio (therapist)    Participating treatment team members: Mikie Maral, CRYSTAL Nazario; Dr. Saundra Rojo MD; Shae Gilliam, PA; Ghazal Brooke, PharmD; [facility] medicine resident

## 2018-07-18 NOTE — BH NOTES
PRN Medication Documentation    Specific patient behavior that led to need for PRN medication: Patient reported she was having difficulty falling asleep and was restless. Staff interventions attempted prior to PRN being given: Encouraged to relax and try a few more minutes  PRN medication given:Give Ambien 5mg po prn for sleep at 2333.   Patient response/effectiveness of PRN medication: Was asleep on rounds at 0000

## 2018-07-18 NOTE — BH NOTES
GROUP THERAPY PROGRESS NOTE    Presley Davidson is participating in reflection group. Group time: 30 minutes    Personal goal for participation: Daily progress    Goal orientation: personal    Group therapy participation: active    Therapeutic interventions reviewed and discussed: Unit rules and regulations and 99 coping skills.      Impression of participation: active

## 2018-07-18 NOTE — BH NOTES
Received pt resting in bed.   No voiced complaints or acute distress at present  Pleasant and cooperative

## 2018-07-19 VITALS
OXYGEN SATURATION: 100 % | HEART RATE: 103 BPM | DIASTOLIC BLOOD PRESSURE: 92 MMHG | TEMPERATURE: 98 F | BODY MASS INDEX: 27.25 KG/M2 | SYSTOLIC BLOOD PRESSURE: 130 MMHG | RESPIRATION RATE: 16 BRPM | WEIGHT: 138.8 LBS | HEIGHT: 60 IN

## 2018-07-19 PROCEDURE — 74011250637 HC RX REV CODE- 250/637: Performed by: PSYCHIATRY & NEUROLOGY

## 2018-07-19 RX ORDER — MIRTAZAPINE 15 MG/1
15 TABLET, FILM COATED ORAL
Qty: 30 TAB | Refills: 0 | Status: SHIPPED | OUTPATIENT
Start: 2018-07-19

## 2018-07-19 RX ADMIN — DEXTROAMPHETAMINE SACCHARATE, AMPHETAMINE ASPARTATE, DEXTROAMPHETAMINE SULFATE AND AMPHETAMINE SULFATE 20 MG: 2.5; 2.5; 2.5; 2.5 TABLET ORAL at 08:15

## 2018-07-19 RX ADMIN — THYROID, PORCINE 45 MG: 60 TABLET ORAL at 07:09

## 2018-07-19 RX ADMIN — Medication 1000 MCG: at 08:16

## 2018-07-19 RX ADMIN — DOCUSATE SODIUM 100 MG: 100 CAPSULE, LIQUID FILLED ORAL at 08:15

## 2018-07-19 RX ADMIN — ACETAMINOPHEN 650 MG: 325 TABLET ORAL at 08:16

## 2018-07-19 RX ADMIN — VITAMIN D, TAB 1000IU (100/BT) 5000 UNITS: 25 TAB at 08:15

## 2018-07-19 RX ADMIN — DEXTROAMPHETAMINE SACCHARATE, AMPHETAMINE ASPARTATE, DEXTROAMPHETAMINE SULFATE AND AMPHETAMINE SULFATE 20 MG: 2.5; 2.5; 2.5; 2.5 TABLET ORAL at 11:56

## 2018-07-19 NOTE — INTERDISCIPLINARY ROUNDS
Behavioral Health Interdisciplinary Rounds     Patient Name: Laverne Baer  Age: 28 y.o. Room/Bed:  729/02  Primary Diagnosis: Anxiety disorder   Admission Status: Voluntary     Readmission within 30 days: no  Power of  in place: no  Patient requires a blocked bed: no          Reason for blocked bed: n/a    VTE Prophylaxis: Not indicated    Mobility needs/Fall risk: no    Nutritional Plan: no  Consults: no         Labs/Testing due today?: no    Sleep hours:  6,0      Participation in Care/Groups:  yes  Medication Compliant?: Yes  PRNS (last 24 hours): Sleep Aid    Restraints (last 24 hours):  no     CIWA (range last 24 hours):     COWS (range last 24 hours):      Alcohol screening (AUDIT) completed -   AUDIT Score: 2     If applicable, date SBIRT discussed in treatment team AND documented:     Tobacco - patient is a smoker: Have You Used Tobacco in the Past 30 Days: Yes  Illegal Drugs use: Have You Used Any Illegal Substances Over the Past 12 Months: No    24 hour chart check complete: yes     Patient goal(s) for today: attend all groups; breakdown to-do list  Treatment team focus/goals: Schedule with psychiatrist  Progress note: Pt presents with brighter affect and improved mood; future-oriented and talking about future plans    LOS:  2  Expected LOS: TBD    Financial concerns/prescription coverage:  Uninsured  Date of last family contact:       Family requesting physician contact today: No  Discharge plan: Return home  Guns in the home:  No      Outpatient provider(s): Dr. Michael Taylor (PCP);  Emily Austin (therapist)    Participating treatment team members: Laverne Baer, CRYSTAL Martin; Dr. Karlee Seay MD; Storm Koyanagi, RN; Rico Tristan, PharmD; Pharmacy student

## 2018-07-19 NOTE — BH NOTES
GROUP THERAPY PROGRESS NOTE    Pollo Davidson participated in a Process Group with a focus identifying feelings, planning for the rest of the day, and reviewing DBT skills for managing emotional distress. Group time: 65 minutes. Personal goal for participation: To increase the capacity to improve ones mood, structure, and coping capacity. Goal orientation: The patient will be able to identify their feelings, develop a plan for structuring their day, and begin to appreciate the possibility of successfully managing distress and other forms of intense emotions. Group therapy participation: With prompting, this patient participated in the group. Therapeutic interventions reviewed and discussed: The group members were asked to introduce themselves to each other and to see if they could identify an emotion they are having and/or let the group know what they want to focus on for the day as they continue to make discharge plans. The group members also reviewed five suggested areas of DBT options when experiencing intense emotions: distraction, improve the moment, self-soothe, pros and cons, and radical acceptance. They were asked to take turns reading from the handout and discussing its contents. At the end of group the patients were provided a summary of the topics covered. Impression of participation: The patient said she was feeling \"anxious\" and \"tired, because another patient was upset on the unit the previous night. \" She did not express any current distress or worry about her current safety. She said she thought she was most concerned about her loss of a recent job and her need to continue to apply for a new one. She was alert and generally oriented. She expressed no current SI/HI and displayed no overt psychotic symptoms. Her affect was anxious but not as anxious or depressed as on admission. Her mood reflected her affect.

## 2018-07-19 NOTE — DISCHARGE INSTRUCTIONS
DISCHARGE SUMMARY:  Substance Abuse    NAME:Martha Davidson  : 1990  MRN: [id number]    The patient Missy Grover exhibits the ability to function in a less restrictive environment. There has been no evidence of withdrawal for the past 24 hours. No suicidal/homicidal threat or behavior has been observed for the past 24 hours. If weapons involved, how are they secured? No weapons involved. Is patient aware of and in agreement with discharge plan? Yes    Arrangements for medication:  Prescriptions given to patient. Referral for substance treatment? Yes, Dr. Marissa Dumont    Referral for smoking cessation needed? Yes, refused. Copy of discharge instructions to provider?:  Dr. Jimena Hernandez for transportation home:  Patient's personal vehicle is in the hospital parking deck. Mental health crisis number:  [id number] or your local mental health crisis line number at [phone]. DISCHARGE SUMMARY from Nurse    PATIENT INSTRUCTIONS:    What to do at Home:  Recommended activity: Activity as tolerated,     If you experience any of the following symptoms thoughts of harming self, feeling overwhelmed with anxiety, hopelessness or sadness, please follow up with your assigned providers and local crisis number. *  Please give a list of your current medications to your Primary Care Provider. *  Please update this list whenever your medications are discontinued, doses are      changed, or new medications (including over-the-counter products) are added. *  Please carry medication information at all times in case of emergency situations. These are general instructions for a healthy lifestyle:    No smoking/ No tobacco products/ Avoid exposure to second hand smoke  Surgeon General's Warning:  Quitting smoking now greatly reduces serious risk to your health.     Obesity, smoking, and sedentary lifestyle greatly increases your risk for illness    A healthy diet, regular physical exercise & weight monitoring are important for maintaining a healthy lifestyle    You may be retaining fluid if you have a history of heart failure or if you experience any of the following symptoms:  Weight gain of 3 pounds or more overnight or 5 pounds in a week, increased swelling in our hands or feet or shortness of breath while lying flat in bed. Please call your doctor as soon as you notice any of these symptoms; do not wait until your next office visit. Recognize signs and symptoms of STROKE:    F-face looks uneven    A-arms unable to move or move unevenly    S-speech slurred or non-existent    T-time-call 911 as soon as signs and symptoms begin-DO NOT go       Back to bed or wait to see if you get better-TIME IS BRAIN. Warning Signs of HEART ATTACK     Call 911 if you have these symptoms:   Chest discomfort. Most heart attacks involve discomfort in the center of the chest that lasts more than a few minutes, or that goes away and comes back. It can feel like uncomfortable pressure, squeezing, fullness, or pain.  Discomfort in other areas of the upper body. Symptoms can include pain or discomfort in one or both arms, the back, neck, jaw, or stomach.  Shortness of breath with or without chest discomfort.  Other signs may include breaking out in a cold sweat, nausea, or lightheadedness. Don't wait more than five minutes to call 911 - MINUTES MATTER! Fast action can save your life. Calling 911 is almost always the fastest way to get lifesaving treatment. Emergency Medical Services staff can begin treatment when they arrive -- up to an hour sooner than if someone gets to the hospital by car. The discharge information has been reviewed with the patient. The patient verbalized understanding.   Discharge medications reviewed with the patient and appropriate educational materials and side effects teaching were provided.   ___________________________________________________________________________________________________________________________________

## 2018-07-19 NOTE — PROGRESS NOTES
Problem: Depressed Mood (Adult/Pediatric)  Goal: *STG: Participates in treatment plan  Outcome: Progressing Towards Goal  Out on unit social w peers and staff. Mood and affect brighter, reports decrease in anxiety and able to focus on creating a plan and set some goals for when she is home. Noted thoughts circumstantial and having difficulty being organized with list and being slightly indecisive. Daily goal is to attend groups and complete goal development. Staff focus is on coping skills education.

## 2018-07-19 NOTE — BH NOTES
Behavioral Health Transition Record to Provider    Patient Name: Salma Rivera  YOB: 1990  Medical Record Number: [id number]  Date of Admission: 7/16/2018  Date of Discharge: 7/19/2018    Attending Provider: Garland Sweet MD  Discharging Provider: Garland Sweet MD  To contact this individual call [phone] and ask the  to page. If unavailable, ask to be transferred to [facility] Provider on call. [facility] Provider will be available on call 24/7 and during holidays. Primary Care Provider: Natalie Stock MD    Allergies   Allergen Reactions    Amoxicillin Rash    Sulfa (Sulfonamide Antibiotics) Rash       Reason for Admission: Pt admitted under a voluntary basis for severe depression with suicidal ideations  NO INTENT OR PLAN  proving to be an imminent danger to self and an inability to care for self. Admission Diagnosis: Depressive disorder  Anxiety disorder    * No surgery found *    Results for orders placed or performed during the hospital encounter of 07/16/18   URINE CULTURE HOLD SAMPLE   Result Value Ref Range    Urine culture hold        URINE ON HOLD IN MICROBIOLOGY DEPT FOR 3 DAYS. IF UNPRESERVED URINE IS SUBMITTED, IT CANNOT BE USED FOR ADDITIONAL TESTING AFTER 24 HRS, RECOLLECTION WILL BE REQUIRED. CBC WITH AUTOMATED DIFF   Result Value Ref Range    WBC 11.5 (H) 3.6 - 11.0 K/uL    RBC 5.30 (H) 3.80 - 5.20 M/uL    HGB 16.0 11.5 - 16.0 g/dL    HCT 47.0 35.0 - 47.0 %    MCV 88.7 80.0 - 99.0 FL    MCH 30.2 26.0 - 34.0 PG    MCHC 34.0 30.0 - 36.5 g/dL    RDW 11.5 11.5 - 14.5 %    PLATELET 764 020 - 879 K/uL    MPV 10.1 8.9 - 12.9 FL    NRBC 0.0 0  WBC    ABSOLUTE NRBC 0.00 0.00 - 0.01 K/uL    NEUTROPHILS 66 32 - 75 %    LYMPHOCYTES 26 12 - 49 %    MONOCYTES 6 5 - 13 %    EOSINOPHILS 1 0 - 7 %    BASOPHILS 0 0 - 1 %    IMMATURE GRANULOCYTES 0 0.0 - 0.5 %    ABS. NEUTROPHILS 7.6 1.8 - 8.0 K/UL    ABS. LYMPHOCYTES 3.0 0.8 - 3.5 K/UL    ABS. MONOCYTES 0.7 0.0 - 1.0 K/UL    ABS. EOSINOPHILS 0.1 0.0 - 0.4 K/UL    ABS. BASOPHILS 0.0 0.0 - 0.1 K/UL    ABS. IMM. GRANS. 0.0 0.00 - 0.04 K/UL    DF AUTOMATED     METABOLIC PANEL, COMPREHENSIVE   Result Value Ref Range    Sodium 141 136 - 145 mmol/L    Potassium 4.1 3.5 - 5.1 mmol/L    Chloride 107 97 - 108 mmol/L    CO2 27 21 - 32 mmol/L    Anion gap 7 5 - 15 mmol/L    Glucose 97 65 - 100 mg/dL    BUN 5 (L) 6 - 20 MG/DL    Creatinine 0.79 0.55 - 1.02 MG/DL    BUN/Creatinine ratio 6 (L) 12 - 20      GFR est AA >60 >60 ml/min/1.73m2    GFR est non-AA >60 >60 ml/min/1.73m2    Calcium 9.3 8.5 - 10.1 MG/DL    Bilirubin, total 0.4 0.2 - 1.0 MG/DL    ALT (SGPT) 38 12 - 78 U/L    AST (SGOT) 21 15 - 37 U/L    Alk.  phosphatase 108 45 - 117 U/L    Protein, total 7.9 6.4 - 8.2 g/dL    Albumin 3.9 3.5 - 5.0 g/dL    Globulin 4.0 2.0 - 4.0 g/dL    A-G Ratio 1.0 (L) 1.1 - 2.2     ETHYL ALCOHOL   Result Value Ref Range    ALCOHOL(ETHYL),SERUM <03 <69 MG/DL   SALICYLATE   Result Value Ref Range    Salicylate level <2.2 (L) 2.8 - 20.0 MG/DL   ACETAMINOPHEN   Result Value Ref Range    Acetaminophen level <2 (L) 10 - 30 ug/mL   DRUG SCREEN, URINE   Result Value Ref Range    AMPHETAMINES POSITIVE (A) NEG      BARBITURATES NEGATIVE  NEG      BENZODIAZEPINES NEGATIVE  NEG      COCAINE NEGATIVE  NEG      METHADONE NEGATIVE  NEG      OPIATES NEGATIVE  NEG      PCP(PHENCYCLIDINE) NEGATIVE  NEG      THC (TH-CANNABINOL) NEGATIVE  NEG      Drug screen comment (NOTE)    URINALYSIS W/MICROSCOPIC   Result Value Ref Range    Color YELLOW/STRAW      Appearance CLEAR CLEAR      Specific gravity <1.005 1.003 - 1.030    pH (UA) 7.0 5.0 - 8.0      Protein NEGATIVE  NEG mg/dL    Glucose NEGATIVE  NEG mg/dL    Ketone NEGATIVE  NEG mg/dL    Bilirubin NEGATIVE  NEG      Blood NEGATIVE  NEG      Urobilinogen 0.2 0.2 - 1.0 EU/dL    Nitrites NEGATIVE  NEG      Leukocyte Esterase NEGATIVE  NEG      WBC 0-4 0 - 4 /hpf    RBC 0-5 0 - 5 /hpf    Epithelial cells FEW FEW /lpf    Bacteria NEGATIVE  NEG /hpf   TSH 3RD GENERATION   Result Value Ref Range    TSH 0.11 (L) 0.36 - 3.74 uIU/mL   HCG URINE, QL. - POC   Result Value Ref Range    Pregnancy test,urine (POC) NEGATIVE  NEG         Immunizations administered during this encounter: There is no immunization history on file for this patient. Screening for Metabolic Disorders for Patients on Antipsychotic Medications  (Data obtained from the EMR)    Estimated Body Mass Index  Estimated body mass index is 27.11 kg/(m^2) as calculated from the following:    Height as of this encounter: 5' (1.524 m). Weight as of this encounter: 63 kg (138 lb 12.8 oz). Vital Signs/Blood Pressure  Visit Vitals    BP (!) 130/92 (BP 1 Location: Right arm, BP Patient Position: At rest;Sitting)    Pulse (!) 103    Temp 98 °F (36.7 °C)    Resp 16    Ht 5' (1.524 m)    Wt 63 kg (138 lb 12.8 oz)    SpO2 100%    BMI 27.11 kg/m2       Blood Glucose/Hemoglobin A1c  Lab Results   Component Value Date/Time    Glucose 97 2018 06:55 PM       No results found for: HBA1C, HGBE8, VVI3JFPQ     Lipid Panel  No results found for: CHOL, CHOLX, CHLST, CHOLV, 006982, HDL, LDL, LDLC, DLDLP, TGLX, TRIGL, TRIGP, CHHD, CHHDX      Discharge Diagnosis: Anxiety disorder (ICD-10-CM: F41.9); Depressive disorder (ICD-10-CM: F32.9)    Discharge Plan: Patient discharged home in her personal vehicle. DISCHARGE SUMMARY:  Substance Abuse    NAME:aMrtha Davidson  : 1990  MRN: [id number]    The patient Smith Martinez exhibits the ability to function in a less restrictive environment. There has been no evidence of withdrawal for the past 24 hours. No suicidal/homicidal threat or behavior has been observed for the past 24 hours. If weapons involved, how are they secured? No weapons involved. Is patient aware of and in agreement with discharge plan? Yes    Arrangements for medication:  Prescriptions given to patient.      Referral for substance treatment? Yes, Dr. Raeann Solorio    Referral for smoking cessation needed? Yes, refused. Copy of discharge instructions to provider?:  Dr. Rossana Fulton for transportation home:  Patient's personal vehicle is in the hospital parking deck. Mental health crisis number:  093 or your local mental health crisis line number at [phone]. Discharge Medication List and Instructions:   Current Discharge Medication List      START taking these medications    Details   mirtazapine (REMERON) 15 mg tablet Take 1 Tab by mouth nightly. Indications: major depressive disorder  Qty: 30 Tab, Refills: 0         CONTINUE these medications which have NOT CHANGED    Details   cholecalciferol, VITAMIN D3, (VITAMIN D3) 5,000 unit tab tablet Take 5,000 Units by mouth daily. niacin ER (NIASPAN) 500 mg tablet Take 500 mg by mouth nightly. topiramate ER (TROKENDI XR) 25 mg capsule Take 25 mg by mouth daily. Indications: MIGRAINE PREVENTION      drospirenone-ethinyl estradiol (LORYNA, 28,) 3-0.02 mg tab Take 1 Tab by mouth daily. Indications: Pregnancy Contraception      !! thyroid, Pork, (ARMOUR THYROID) 15 mg tablet Take 15 mg by mouth Daily (before breakfast). !! thyroid, Pork, (ARMOUR THYROID) 30 mg tablet Take 30 mg by mouth Daily (before breakfast). cyanocobalamin (VITAMIN B-12) 1,000 mcg tablet Take 1,000 mcg by mouth daily. dextroamphetamine-amphetamine (ADDERALL) 20 mg tablet Take 20 mg by mouth three (3) times daily. fluticasone (FLONASE) 50 mcg/actuation nasal spray 2 Sprays by Both Nostrils route daily. !! - Potential duplicate medications found. Please discuss with provider.       STOP taking these medications       LORazepam (ATIVAN) 1 mg tablet Comments:   Reason for Stopping:               Unresulted Labs     None        To obtain results of studies pending at discharge, please contact [phone]    Follow-up Information     Follow up With Details Comments Contact Info Cipriano Early On 7/30/2018 You have a 3:00pm appointment for individual therapy. [address] [facility]  [address] [facility], [address] [facility] [address]  [phone]    Payam Aquino MD On 7/20/2018 You have a 1:30pm appointment with your primary care provider. [address] [facility]  [address] Call Please call to schedule an appointment with a psychiatric nurse practitioner for medication management. [address], [address]  [place], [address] [facility]  [phone]          Advanced Directive:   Does the patient have an appointed surrogate decision maker? No  Does the patient have a Medical Advance Directive? No  Does the patient have a Psychiatric Advance Directive? No  If the patient does not have a surrogate or Medical Advance Directive AND Psychiatric Advance Directive, the patient was offered information on these advance directives Yes and Patient declined to complete    Patient Instructions: Please continue all medications until otherwise directed by physician. Tobacco Cessation Discharge Plan:   Is the patient a smoker and needs referral for smoking cessation? Yes  Patient referred to the following for smoking cessation with an appointment? Refused     Patient was offered medication to assist with smoking cessation at discharge? Refused  Was education for smoking cessation added to the discharge instructions? Yes    Alcohol/Substance Abuse Discharge Plan:   Does the patient have a history of substance/alcohol abuse and requires a referral for treatment? Yes  Patient referred to the following for substance/alcohol abuse treatment with an appointment? Yes, Patient has an appointment with Dr. Regla Polo on 7/20/18 at 1:30pm.  Patient was offered medication to assist with alcohol cessation at discharge? Refused  Was education for substance/alcohol abuse added to discharge instructions?  Yes    Patient discharged to Home; discussed with patient/caregiver and provided to the patient/caregiver either in hard copy or electronically.

## 2018-07-19 NOTE — BH NOTES
GROUP THERAPY PROGRESS NOTE    Jonathan Ruelas is participating in Reflections. Group time: 25 minutes    Personal goal for participation: daily progress    Goal orientation: personal    Group therapy participation: passive    Therapeutic interventions reviewed and discussed: Reviewed goals for the day and \"Stress\" handouts. Impression of participation: Seems in fair spirits. States met goal for the day and has been relaxing doing leisure/recreational activities. Flat affect.

## 2018-07-19 NOTE — PROGRESS NOTES
Pharmacist Discharge Medication Reconciliation    Discharging Provider: Dr. Kayla Ewing    Significant PMH:   Past Medical History:   Diagnosis Date    ADHD     Delayed gastric emptying     Endometritis     Hypothyroid     PCOS (polycystic ovarian syndrome)     Psychiatric disorder     anxiety depression     Chief Complaint for this Admission:   Chief Complaint   Patient presents with    Mental Health Problem     Allergies: Amoxicillin and Sulfa (sulfonamide antibiotics)    Discharge Medications:   Current Discharge Medication List        START taking these medications    Details   mirtazapine (REMERON) 15 mg tablet Take 1 Tab by mouth nightly. Indications: major depressive disorder  Qty: 30 Tab, Refills: 0           CONTINUE these medications which have NOT CHANGED    Details   cholecalciferol, VITAMIN D3, (VITAMIN D3) 5,000 unit tab tablet Take 5,000 Units by mouth daily. niacin ER (NIASPAN) 500 mg tablet Take 500 mg by mouth nightly. topiramate ER (TROKENDI XR) 25 mg capsule Take 25 mg by mouth daily. Indications: MIGRAINE PREVENTION      drospirenone-ethinyl estradiol (LORYNA, 28,) 3-0.02 mg tab Take 1 Tab by mouth daily. Indications: Pregnancy Contraception      !! thyroid, Pork, (ARMOUR THYROID) 15 mg tablet Take 15 mg by mouth Daily (before breakfast). !! thyroid, Pork, (ARMOUR THYROID) 30 mg tablet Take 30 mg by mouth Daily (before breakfast). cyanocobalamin (VITAMIN B-12) 1,000 mcg tablet Take 1,000 mcg by mouth daily. dextroamphetamine-amphetamine (ADDERALL) 20 mg tablet Take 20 mg by mouth three (3) times daily. fluticasone (FLONASE) 50 mcg/actuation nasal spray 2 Sprays by Both Nostrils route daily. !! - Potential duplicate medications found. Please discuss with provider.         STOP taking these medications       LORazepam (ATIVAN) 1 mg tablet Comments:   Reason for Stopping:             The patient's chart, MAR and AVS were reviewed by GISELA ClarkeD.

## 2018-07-19 NOTE — PROGRESS NOTES
GROUP THERAPY PROGRESS NOTE    Bindu Dumas is participating in Goals Group and Community. Group time: 35 minutes    Personal goal for participation: Spend time with daughter, talk to doctor about medications    Goal orientation: personal    Group therapy participation: active    Therapeutic interventions reviewed and discussed: Reviewed roles of different staff members to orient patients to unit. Discussed the different roles of the members of treatment team and highlighted importance of monitoring symptoms and side effects to discuss with the doctor, nurse, pharmacist, and  throughout the day. Discussed importance of daily goal setting and importance of setting goals that are definable, achievable and things that can be tracked.     Impression of participation: Actively listened, participated in worksheet provided by staff, participated in conversation with peers and staff

## 2018-07-19 NOTE — DISCHARGE SUMMARY
PSYCHIATRIC DISCHARGE SUMMARY         IDENTIFICATION:    Patient Name  Lalo Gagnon   Date of Birth 1990   [facility] [id number]   Medical Record Number  [id number]      Age  28 y.o. PCP Johann Quick MD   Admit date:  7/16/2018    Discharge date: 7/19/2018   Room Number  729/02  @ [facility]   Date of Service  7/19/2018               TYPE OF DISCHARGE: REGULAR               CONDITION AT DISCHARGE: good       PROVISIONAL & DISCHARGE DIAGNOSES:    Problem List  Never Reviewed          Codes Class    * (Principal)Anxiety disorder ICD-10-CM: F41.9  ICD-9-CM: 300.00         Depressive disorder ICD-10-CM: F32.9  ICD-9-CM: [address] Problems    *Anxiety disorder      Depressive disorder        DISCHARGE DIAGNOSIS:   Axis I:  SEE ABOVE  Axis II: SEE ABOVE  Axis III: SEE ABOVE  Axis IV:  lack of structure  Axis V:  70 on admission, 70 on discharge 70(baseline)       CC & HISTORY OF PRESENT ILLNESS:  28year old  femal;e on chronic amphetamines and benzodiazepines, admitted voluntarily for depressed mood. Pt declined to change preadmission amphetamine, Ativan was not given in the hospital. At discharge she denied SI/HI intent and plan and did not meet TDO criteria. SOCIAL HISTORY:    Social History     Social History    Marital status: SINGLE     Spouse name: N/A    Number of children: N/A    Years of education: N/A     Occupational History    Not on file. Social History Main Topics    Smoking status: Never Smoker    Smokeless tobacco: Never Used    Alcohol use No    Drug use: No    Sexual activity: Not on file     Other Topics Concern    Not on file     Social History Narrative    28YEAR OLD  female admitted voluntarily for depression and anxiety, PT has been on adderal and has had hallucinatory experiences. She was placed on Aderal in 2013 when she was \"working full time and also in graduate school for neurobiology.  Pt has been unemployed since May 2018 and has  from her boyfriend as well. She lives with her 3year old daughter and her mother. FAMILY HISTORY:   History reviewed. No pertinent family history. HOSPITALIZATION COURSE:    Nataliia Quiroz was admitted to the inpatient psychiatric unit [facility] for acute psychiatric stabilization in regards to symptomatology as described in the HPI above. The differential diagnosis at time of admission included: depression . While on the unit Martha Davidson was involved in individual, group, occupational and milieu therapy. Psychiatric medications were adjusted during this hospitalization including remeron . Nataliia Quiroz demonstrated a slow, but progressive improvement in overall condition. Much of patient's depression appeared to be related to situational stressors and psychological factors. Please see individual progress notes for more specific details regarding patient's hospitalization course. At time of dc, Tyshawn Davidson was without significant problems with depression psychosis  dodie. Overall presentation at time of discharge is most consistent with the diagnosis of depression Patient with request for discharge today. There are no grounds to seek a TDO. Patient has maximized benefit to be derived from acute inpatient psychiatric treatment.   All members of the treatment team concur with each other in regards to plans for discharge today per patient's request.          LABS AND IMAGAING:    Labs Reviewed   CBC WITH AUTOMATED DIFF - Abnormal; Notable for the following:        Result Value    WBC 11.5 (*)     RBC 5.30 (*)     All other components within normal limits   METABOLIC PANEL, COMPREHENSIVE - Abnormal; Notable for the following:     BUN 5 (*)     BUN/Creatinine ratio 6 (*)     A-G Ratio 1.0 (*)     All other components within normal limits   SALICYLATE - Abnormal; Notable for the following:     Salicylate level <8.0 (*)     All other components within normal limits ACETAMINOPHEN - Abnormal; Notable for the following:     Acetaminophen level <2 (*)     All other components within normal limits   DRUG SCREEN, URINE - Abnormal; Notable for the following:     AMPHETAMINES POSITIVE (*)     All other components within normal limits   TSH 3RD GENERATION - Abnormal; Notable for the following:     TSH 0.11 (*)     All other components within normal limits   URINE CULTURE HOLD SAMPLE   ETHYL ALCOHOL   URINALYSIS W/MICROSCOPIC   SAMPLES BEING HELD   HCG URINE, QL. - POC     Admission on 07/16/2018   Component Date Value Ref Range Status    WBC 07/16/2018 11.5* 3.6 - 11.0 K/uL Final    RBC 07/16/2018 5.30* 3.80 - 5.20 M/uL Final    HGB 07/16/2018 16.0  11.5 - 16.0 g/dL Final    HCT 07/16/2018 47.0  35.0 - 47.0 % Final    MCV 07/16/2018 88.7  80.0 - 99.0 FL Final    MCH 07/16/2018 30.2  26.0 - 34.0 PG Final    MCHC 07/16/2018 34.0  30.0 - 36.5 g/dL Final    RDW 07/16/2018 11.5  11.5 - 14.5 % Final    PLATELET 93/60/9476 036  150 - 400 K/uL Final    MPV 07/16/2018 10.1  8.9 - 12.9 FL Final    NRBC 07/16/2018 0.0  0  WBC Final    ABSOLUTE NRBC 07/16/2018 0.00  0.00 - 0.01 K/uL Final    NEUTROPHILS 07/16/2018 66  32 - 75 % Final    LYMPHOCYTES 07/16/2018 26  12 - 49 % Final    MONOCYTES 07/16/2018 6  5 - 13 % Final    EOSINOPHILS 07/16/2018 1  0 - 7 % Final    BASOPHILS 07/16/2018 0  0 - 1 % Final    IMMATURE GRANULOCYTES 07/16/2018 0  0.0 - 0.5 % Final    ABS. NEUTROPHILS 07/16/2018 7.6  1.8 - 8.0 K/UL Final    ABS. LYMPHOCYTES 07/16/2018 3.0  0.8 - 3.5 K/UL Final    ABS. MONOCYTES 07/16/2018 0.7  0.0 - 1.0 K/UL Final    ABS. EOSINOPHILS 07/16/2018 0.1  0.0 - 0.4 K/UL Final    ABS. BASOPHILS 07/16/2018 0.0  0.0 - 0.1 K/UL Final    ABS. IMM.  GRANS. 07/16/2018 0.0  0.00 - 0.04 K/UL Final    DF 07/16/2018 AUTOMATED    Final    Sodium 07/16/2018 141  136 - 145 mmol/L Final    Potassium 07/16/2018 4.1  3.5 - 5.1 mmol/L Final    Chloride 07/16/2018 107  97 - 108 mmol/L Final    CO2 07/16/2018 27  21 - 32 mmol/L Final    Anion gap 07/16/2018 7  5 - 15 mmol/L Final    Glucose 07/16/2018 97  65 - 100 mg/dL Final    BUN 07/16/2018 5* 6 - 20 MG/DL Final    Creatinine 07/16/2018 0.79  0.55 - 1.02 MG/DL Final    BUN/Creatinine ratio 07/16/2018 6* 12 - 20   Final    GFR est AA 07/16/2018 >60  >60 ml/min/1.73m2 Final    GFR est non-AA 07/16/2018 >60  >60 ml/min/1.73m2 Final    Calcium 07/16/2018 9.3  8.5 - 10.1 MG/DL Final    Bilirubin, total 07/16/2018 0.4  0.2 - 1.0 MG/DL Final    ALT (SGPT) 07/16/2018 38  12 - 78 U/L Final    AST (SGOT) 07/16/2018 21  15 - 37 U/L Final    Alk.  phosphatase 07/16/2018 108  45 - 117 U/L Final    Protein, total 07/16/2018 7.9  6.4 - 8.2 g/dL Final    Albumin 07/16/2018 3.9  3.5 - 5.0 g/dL Final    Globulin 07/16/2018 4.0  2.0 - 4.0 g/dL Final    A-G Ratio 07/16/2018 1.0* 1.1 - 2.2   Final    ALCOHOL(ETHYL),SERUM 07/16/2018 <10  <10 MG/DL Final    Salicylate level 57/27/4185 <1.7* 2.8 - 20.0 MG/DL Final    Acetaminophen level 07/16/2018 <2* 10 - 30 ug/mL Final    AMPHETAMINES 07/16/2018 POSITIVE* NEG   Final    BARBITURATES 07/16/2018 NEGATIVE   NEG   Final    BENZODIAZEPINES 07/16/2018 NEGATIVE   NEG   Final    COCAINE 07/16/2018 NEGATIVE   NEG   Final    METHADONE 07/16/2018 NEGATIVE   NEG   Final    OPIATES 07/16/2018 NEGATIVE   NEG   Final    PCP(PHENCYCLIDINE) 07/16/2018 NEGATIVE   NEG   Final    THC (TH-CANNABINOL) 07/16/2018 NEGATIVE   NEG   Final    Drug screen comment 07/16/2018 (NOTE)   Final    Color 07/16/2018 YELLOW/STRAW    Final    Appearance 07/16/2018 CLEAR  CLEAR   Final    Specific gravity 07/16/2018 <1.005  1.003 - 1.030 Final    pH (UA) 07/16/2018 7.0  5.0 - 8.0   Final    Protein 07/16/2018 NEGATIVE   NEG mg/dL Final    Glucose 07/16/2018 NEGATIVE   NEG mg/dL Final    Ketone 07/16/2018 NEGATIVE   NEG mg/dL Final    Bilirubin 07/16/2018 NEGATIVE   NEG   Final    Blood 07/16/2018 NEGATIVE NEG   Final    Urobilinogen 07/16/2018 0.2  0.2 - 1.0 EU/dL Final    Nitrites 07/16/2018 NEGATIVE   NEG   Final    Leukocyte Esterase 07/16/2018 NEGATIVE   NEG   Final    WBC 07/16/2018 0-4  0 - 4 /hpf Final    RBC 07/16/2018 0-5  0 - 5 /hpf Final    Epithelial cells 07/16/2018 FEW  FEW /lpf Final    Bacteria 07/16/2018 NEGATIVE   NEG /hpf Final    Urine culture hold 07/16/2018 URINE ON HOLD IN MICROBIOLOGY DEPT FOR 3 DAYS. IF UNPRESERVED URINE IS SUBMITTED, IT CANNOT BE USED FOR ADDITIONAL TESTING AFTER 24 HRS, RECOLLECTION WILL BE REQUIRED. Final    Pregnancy test,urine (POC) 07/16/2018 NEGATIVE   NEG   Final    TSH 07/16/2018 0.11* 0.36 - 3.74 uIU/mL Final     No results found. DISPOSITION:    Home. Patient to f/u with o/p psychiatric, and psychotherapy appointments. Patient is to f/u with internist as directed. FOLLOW-UP CARE:    Activity as tolerated  Regular Diet  Wound Care: none needed. Follow-up Information     Follow up With Details Comments Contact Info    [place], [facility]  [address]  [phone]    Rosales Ellis MD   [address] [place] [address] [phone]                   PROGNOSIS:  Greatly dependent upon patient's ability to f/u with o/p psychiatric/psychotherapy appointments as well as to comply with psychiatric medications as prescribed. Patient denies suicidal or homicidal ideations. Amina Powell fully contracts for safety. Patient reports many positive predictive factors in terms of not attempting suicide or homicide. Patient appears to be at low risk of suicide or homicide. Patient and family are aware and in agreement with discharge and discharge plan. DISCHARGE MEDICATIONS: (no changes made).     Informed consent given for the use of following psychotropic medications:  Current Discharge Medication List      START taking these medications Details   mirtazapine (REMERON) 15 mg tablet Take 1 Tab by mouth nightly. Indications: major depressive disorder  Qty: 30 Tab, Refills: 0         CONTINUE these medications which have NOT CHANGED    Details   cholecalciferol, VITAMIN D3, (VITAMIN D3) 5,000 unit tab tablet Take 5,000 Units by mouth daily. niacin ER (NIASPAN) 500 mg tablet Take 500 mg by mouth nightly. topiramate ER (TROKENDI XR) 25 mg capsule Take 25 mg by mouth daily. Indications: MIGRAINE PREVENTION      drospirenone-ethinyl estradiol (LORYNA, 28,) 3-0.02 mg tab Take 1 Tab by mouth daily. Indications: Pregnancy Contraception      !! thyroid, Pork, (ARMOUR THYROID) 15 mg tablet Take 15 mg by mouth Daily (before breakfast). !! thyroid, Pork, (ARMOUR THYROID) 30 mg tablet Take 30 mg by mouth Daily (before breakfast). cyanocobalamin (VITAMIN B-12) 1,000 mcg tablet Take 1,000 mcg by mouth daily. dextroamphetamine-amphetamine (ADDERALL) 20 mg tablet Take 20 mg by mouth three (3) times daily. fluticasone (FLONASE) 50 mcg/actuation nasal spray 2 Sprays by Both Nostrils route daily. !! - Potential duplicate medications found. Please discuss with provider. STOP taking these medications       LORazepam (ATIVAN) 1 mg tablet Comments:   Reason for Stopping:                      A coordinated, multidisplinary treatment team round was conducted with Elkin Davidson---this is done daily here at [facility] . This team consists of the nurse, psychiatric unit pharmcist,  and writer. I have spent greater than 35 minutes on discharge work.     Signed:  Anushka Maldonado MD  7/19/2018

## 2018-07-19 NOTE — BH NOTES
PRN Medication Documentation    Specific patient behavior that led to need for PRN medication: Difficulty falling after being awaken by peer and requested medication.   Staff interventions attempted prior to PRN being given:Encouraged her to close door and staff will be checking on her  PRN medication given: Ambien 5 mg prn for sleep at 2330  Patient response/effectiveness of PRN medication: Was asleep on rounds at [facility]

## 2019-08-07 ENCOUNTER — TELEPHONE (OUTPATIENT)
Dept: SLEEP MEDICINE | Age: 29
End: 2019-08-07

## 2019-08-07 NOTE — TELEPHONE ENCOUNTER
MARINO on 08/07/2019 for patient to call the office to schedule a sleep medicine consult visit, per Dr. Severo Barlow.

## 2022-03-19 PROBLEM — F32.A DEPRESSIVE DISORDER: Status: ACTIVE | Noted: 2018-07-16

## 2022-03-19 PROBLEM — F41.9 ANXIETY DISORDER: Status: ACTIVE | Noted: 2018-07-16

## 2024-03-27 ENCOUNTER — HOSPITAL ENCOUNTER (OUTPATIENT)
Facility: HOSPITAL | Age: 34
Discharge: HOME | End: 2024-03-30
Payer: COMMERCIAL

## 2024-03-27 DIAGNOSIS — N18.2 CHRONIC KIDNEY DISEASE, STAGE II (MILD): ICD-10-CM

## 2024-03-27 PROCEDURE — 76770 US EXAM ABDO BACK WALL COMP: CPT

## 2025-08-04 ENCOUNTER — HOSPITAL ENCOUNTER (OUTPATIENT)
Facility: HOSPITAL | Age: 35
Discharge: HOME | End: 2025-08-07
Attending: INTERNAL MEDICINE
Payer: COMMERCIAL

## 2025-08-04 DIAGNOSIS — N17.9 ACUTE RENAL FAILURE, UNSPECIFIED ACUTE RENAL FAILURE TYPE: ICD-10-CM

## 2025-08-04 PROCEDURE — 76770 US EXAM ABDO BACK WALL COMP: CPT
